# Patient Record
Sex: FEMALE | Race: WHITE | NOT HISPANIC OR LATINO | Employment: OTHER | URBAN - METROPOLITAN AREA
[De-identification: names, ages, dates, MRNs, and addresses within clinical notes are randomized per-mention and may not be internally consistent; named-entity substitution may affect disease eponyms.]

---

## 2022-10-05 ENCOUNTER — HOSPITAL ENCOUNTER (INPATIENT)
Facility: HOSPITAL | Age: 62
LOS: 5 days | Discharge: HOME HEALTH | DRG: 871 | End: 2022-10-10
Attending: EMERGENCY MEDICINE | Admitting: INTERNAL MEDICINE
Payer: COMMERCIAL

## 2022-10-05 DIAGNOSIS — R29.898 MUSCULAR DECONDITIONING: Chronic | ICD-10-CM

## 2022-10-05 DIAGNOSIS — K70.31 ALCOHOLIC CIRRHOSIS OF LIVER WITH ASCITES: Chronic | ICD-10-CM

## 2022-10-05 DIAGNOSIS — M25.562 ACUTE PAIN OF LEFT KNEE: ICD-10-CM

## 2022-10-05 DIAGNOSIS — R53.1 WEAKNESS: ICD-10-CM

## 2022-10-05 DIAGNOSIS — M62.82 NON-TRAUMATIC RHABDOMYOLYSIS: ICD-10-CM

## 2022-10-05 DIAGNOSIS — R29.6 FALLS FREQUENTLY: ICD-10-CM

## 2022-10-05 DIAGNOSIS — F10.10 ETOH ABUSE: Chronic | ICD-10-CM

## 2022-10-05 DIAGNOSIS — I50.9 CHF (CONGESTIVE HEART FAILURE): ICD-10-CM

## 2022-10-05 DIAGNOSIS — L03.116 BILATERAL LOWER LEG CELLULITIS: ICD-10-CM

## 2022-10-05 DIAGNOSIS — L03.115 BILATERAL LOWER LEG CELLULITIS: ICD-10-CM

## 2022-10-05 DIAGNOSIS — K70.30 ALCOHOLIC CIRRHOSIS, UNSPECIFIED WHETHER ASCITES PRESENT: Chronic | ICD-10-CM

## 2022-10-05 DIAGNOSIS — M25.461 PAIN AND SWELLING OF RIGHT KNEE: ICD-10-CM

## 2022-10-05 DIAGNOSIS — R07.9 CHEST PAIN: ICD-10-CM

## 2022-10-05 DIAGNOSIS — Z72.0 TOBACCO ABUSE: Chronic | ICD-10-CM

## 2022-10-05 DIAGNOSIS — M25.561 PAIN AND SWELLING OF RIGHT KNEE: ICD-10-CM

## 2022-10-05 DIAGNOSIS — A41.9 ACUTE ONSET SEPSIS: Primary | ICD-10-CM

## 2022-10-05 LAB
ALBUMIN SERPL-MCNC: 3 GM/DL (ref 3.4–4.8)
ALBUMIN/GLOB SERPL: 0.9 RATIO (ref 1.1–2)
ALP SERPL-CCNC: 75 UNIT/L (ref 40–150)
ALT SERPL-CCNC: 40 UNIT/L (ref 0–55)
AMPHET UR QL SCN: NEGATIVE
APPEARANCE UR: CLEAR
APTT PPP: 29.8 SECONDS (ref 23.2–33.7)
AST SERPL-CCNC: 87 UNIT/L (ref 5–34)
BACTERIA #/AREA URNS AUTO: NORMAL /HPF
BARBITURATE SCN PRESENT UR: NEGATIVE
BASOPHILS # BLD AUTO: 0.05 X10(3)/MCL (ref 0–0.2)
BASOPHILS NFR BLD AUTO: 0.7 %
BENZODIAZ UR QL SCN: NEGATIVE
BILIRUB UR QL STRIP.AUTO: ABNORMAL MG/DL
BILIRUBIN DIRECT+TOT PNL SERPL-MCNC: 2.9 MG/DL
BNP BLD-MCNC: 68.5 PG/ML
BUN SERPL-MCNC: 8.5 MG/DL (ref 9.8–20.1)
CALCIUM SERPL-MCNC: 8.2 MG/DL (ref 8.4–10.2)
CANNABINOIDS UR QL SCN: NEGATIVE
CHLORIDE SERPL-SCNC: 102 MMOL/L (ref 98–107)
CK SERPL-CCNC: 2006 U/L (ref 29–168)
CO2 SERPL-SCNC: 26 MMOL/L (ref 23–31)
COCAINE UR QL SCN: NEGATIVE
COLOR UR AUTO: ABNORMAL
CREAT SERPL-MCNC: 0.51 MG/DL (ref 0.55–1.02)
CRP SERPL-MCNC: 24.2 MG/L
EOSINOPHIL # BLD AUTO: 0.06 X10(3)/MCL (ref 0–0.9)
EOSINOPHIL NFR BLD AUTO: 0.8 %
ERYTHROCYTE [DISTWIDTH] IN BLOOD BY AUTOMATED COUNT: 14.4 % (ref 11.5–17)
ERYTHROCYTE [SEDIMENTATION RATE] IN BLOOD: 29 MM/HR (ref 0–20)
ETHANOL SERPL-MCNC: 32 MG/DL
FENTANYL UR QL SCN: NEGATIVE
GFR SERPLBLD CREATININE-BSD FMLA CKD-EPI: >60 MLS/MIN/1.73/M2
GLOBULIN SER-MCNC: 3.5 GM/DL (ref 2.4–3.5)
GLUCOSE SERPL-MCNC: 135 MG/DL (ref 82–115)
GLUCOSE UR QL STRIP.AUTO: NEGATIVE MG/DL
HCT VFR BLD AUTO: 40.7 % (ref 37–47)
HGB BLD-MCNC: 13.7 GM/DL (ref 12–16)
IMM GRANULOCYTES # BLD AUTO: 0.02 X10(3)/MCL (ref 0–0.04)
IMM GRANULOCYTES NFR BLD AUTO: 0.3 %
INR BLD: 1.32 (ref 0–1.3)
KETONES UR QL STRIP.AUTO: ABNORMAL MG/DL
LACTATE SERPL-SCNC: 1.1 MMOL/L (ref 0.5–2.2)
LACTATE SERPL-SCNC: 2.3 MMOL/L (ref 0.5–2.2)
LEUKOCYTE ESTERASE UR QL STRIP.AUTO: ABNORMAL UNIT/L
LIPASE SERPL-CCNC: 24 U/L
LYMPHOCYTES # BLD AUTO: 0.99 X10(3)/MCL (ref 0.6–4.6)
LYMPHOCYTES NFR BLD AUTO: 13.1 %
MAGNESIUM SERPL-MCNC: 1.5 MG/DL (ref 1.6–2.6)
MCH RBC QN AUTO: 32.5 PG (ref 27–31)
MCHC RBC AUTO-ENTMCNC: 33.7 MG/DL (ref 33–36)
MCV RBC AUTO: 96.4 FL (ref 80–94)
MDMA UR QL SCN: NEGATIVE
MONOCYTES # BLD AUTO: 0.77 X10(3)/MCL (ref 0.1–1.3)
MONOCYTES NFR BLD AUTO: 10.2 %
NEUTROPHILS # BLD AUTO: 5.7 X10(3)/MCL (ref 2.1–9.2)
NEUTROPHILS NFR BLD AUTO: 74.9 %
NITRITE UR QL STRIP.AUTO: POSITIVE
NRBC BLD AUTO-RTO: 0 %
OPIATES UR QL SCN: NEGATIVE
PCP UR QL: NEGATIVE
PH UR STRIP.AUTO: 6.5 [PH]
PH UR: 6.5 [PH] (ref 3–11)
PLATELET # BLD AUTO: 60 X10(3)/MCL (ref 130–400)
PMV BLD AUTO: 11.9 FL (ref 7.4–10.4)
POTASSIUM SERPL-SCNC: 3.6 MMOL/L (ref 3.5–5.1)
PROT SERPL-MCNC: 6.5 GM/DL (ref 5.8–7.6)
PROT UR QL STRIP.AUTO: ABNORMAL MG/DL
PROTHROMBIN TIME: 16.2 SECONDS (ref 12.5–14.5)
RBC # BLD AUTO: 4.22 X10(6)/MCL (ref 4.2–5.4)
RBC #/AREA URNS AUTO: <5 /HPF
RBC UR QL AUTO: NEGATIVE UNIT/L
SODIUM SERPL-SCNC: 140 MMOL/L (ref 136–145)
SP GR UR STRIP.AUTO: 1.02 (ref 1–1.03)
SPECIFIC GRAVITY, URINE AUTO (.000) (OHS): 1.02 (ref 1–1.03)
SQUAMOUS #/AREA URNS AUTO: <5 /HPF
TROPONIN I SERPL-MCNC: 0.02 NG/ML (ref 0–0.04)
UROBILINOGEN UR STRIP-ACNC: 4 MG/DL
WBC # SPEC AUTO: 7.6 X10(3)/MCL (ref 4.5–11.5)
WBC #/AREA URNS AUTO: <5 /HPF

## 2022-10-05 PROCEDURE — 86140 C-REACTIVE PROTEIN: CPT | Performed by: EMERGENCY MEDICINE

## 2022-10-05 PROCEDURE — 63600175 PHARM REV CODE 636 W HCPCS: Performed by: EMERGENCY MEDICINE

## 2022-10-05 PROCEDURE — 85730 THROMBOPLASTIN TIME PARTIAL: CPT | Performed by: EMERGENCY MEDICINE

## 2022-10-05 PROCEDURE — 36415 COLL VENOUS BLD VENIPUNCTURE: CPT | Performed by: EMERGENCY MEDICINE

## 2022-10-05 PROCEDURE — 80307 DRUG TEST PRSMV CHEM ANLYZR: CPT | Performed by: EMERGENCY MEDICINE

## 2022-10-05 PROCEDURE — 80074 ACUTE HEPATITIS PANEL: CPT | Performed by: NURSE PRACTITIONER

## 2022-10-05 PROCEDURE — 21400001 HC TELEMETRY ROOM

## 2022-10-05 PROCEDURE — 81001 URINALYSIS AUTO W/SCOPE: CPT | Performed by: EMERGENCY MEDICINE

## 2022-10-05 PROCEDURE — 85651 RBC SED RATE NONAUTOMATED: CPT | Performed by: EMERGENCY MEDICINE

## 2022-10-05 PROCEDURE — 80053 COMPREHEN METABOLIC PANEL: CPT | Performed by: EMERGENCY MEDICINE

## 2022-10-05 PROCEDURE — 85610 PROTHROMBIN TIME: CPT | Performed by: EMERGENCY MEDICINE

## 2022-10-05 PROCEDURE — 93005 ELECTROCARDIOGRAM TRACING: CPT

## 2022-10-05 PROCEDURE — 87522 HEPATITIS C REVRS TRNSCRPJ: CPT | Performed by: NURSE PRACTITIONER

## 2022-10-05 PROCEDURE — 99291 CRITICAL CARE FIRST HOUR: CPT | Mod: 25

## 2022-10-05 PROCEDURE — 83690 ASSAY OF LIPASE: CPT | Performed by: EMERGENCY MEDICINE

## 2022-10-05 PROCEDURE — 93010 ELECTROCARDIOGRAM REPORT: CPT | Mod: ,,, | Performed by: INTERNAL MEDICINE

## 2022-10-05 PROCEDURE — 83605 ASSAY OF LACTIC ACID: CPT | Performed by: EMERGENCY MEDICINE

## 2022-10-05 PROCEDURE — 82550 ASSAY OF CK (CPK): CPT | Performed by: EMERGENCY MEDICINE

## 2022-10-05 PROCEDURE — 85025 COMPLETE CBC W/AUTO DIFF WBC: CPT | Performed by: EMERGENCY MEDICINE

## 2022-10-05 PROCEDURE — 11000001 HC ACUTE MED/SURG PRIVATE ROOM

## 2022-10-05 PROCEDURE — 93010 EKG 12-LEAD: ICD-10-PCS | Mod: ,,, | Performed by: INTERNAL MEDICINE

## 2022-10-05 PROCEDURE — 96374 THER/PROPH/DIAG INJ IV PUSH: CPT

## 2022-10-05 PROCEDURE — 87040 BLOOD CULTURE FOR BACTERIA: CPT | Performed by: EMERGENCY MEDICINE

## 2022-10-05 PROCEDURE — 96375 TX/PRO/DX INJ NEW DRUG ADDON: CPT

## 2022-10-05 PROCEDURE — 82077 ASSAY SPEC XCP UR&BREATH IA: CPT | Performed by: EMERGENCY MEDICINE

## 2022-10-05 PROCEDURE — 83735 ASSAY OF MAGNESIUM: CPT | Performed by: EMERGENCY MEDICINE

## 2022-10-05 PROCEDURE — 25000003 PHARM REV CODE 250: Performed by: EMERGENCY MEDICINE

## 2022-10-05 PROCEDURE — 83880 ASSAY OF NATRIURETIC PEPTIDE: CPT | Performed by: EMERGENCY MEDICINE

## 2022-10-05 PROCEDURE — 84484 ASSAY OF TROPONIN QUANT: CPT | Performed by: EMERGENCY MEDICINE

## 2022-10-05 RX ORDER — FOLIC ACID 1 MG/1
1 TABLET ORAL DAILY
Status: DISCONTINUED | OUTPATIENT
Start: 2022-10-06 | End: 2022-10-10 | Stop reason: HOSPADM

## 2022-10-05 RX ORDER — PROCHLORPERAZINE EDISYLATE 5 MG/ML
5 INJECTION INTRAMUSCULAR; INTRAVENOUS EVERY 6 HOURS PRN
Status: DISCONTINUED | OUTPATIENT
Start: 2022-10-05 | End: 2022-10-10 | Stop reason: HOSPADM

## 2022-10-05 RX ORDER — CHLORDIAZEPOXIDE HYDROCHLORIDE 25 MG/1
25 CAPSULE, GELATIN COATED ORAL 4 TIMES DAILY PRN
Status: DISCONTINUED | OUTPATIENT
Start: 2022-10-06 | End: 2022-10-10 | Stop reason: HOSPADM

## 2022-10-05 RX ORDER — VANCOMYCIN HCL IN 5 % DEXTROSE 1G/250ML
2000 PLASTIC BAG, INJECTION (ML) INTRAVENOUS
Status: COMPLETED | OUTPATIENT
Start: 2022-10-05 | End: 2022-10-05

## 2022-10-05 RX ORDER — ONDANSETRON 2 MG/ML
4 INJECTION INTRAMUSCULAR; INTRAVENOUS ONCE
Status: COMPLETED | OUTPATIENT
Start: 2022-10-05 | End: 2022-10-05

## 2022-10-05 RX ORDER — SODIUM CHLORIDE 9 MG/ML
INJECTION, SOLUTION INTRAVENOUS CONTINUOUS
Status: DISCONTINUED | OUTPATIENT
Start: 2022-10-06 | End: 2022-10-05

## 2022-10-05 RX ORDER — POLYETHYLENE GLYCOL 3350 17 G/17G
17 POWDER, FOR SOLUTION ORAL 2 TIMES DAILY PRN
Status: DISCONTINUED | OUTPATIENT
Start: 2022-10-05 | End: 2022-10-10 | Stop reason: HOSPADM

## 2022-10-05 RX ORDER — MAGNESIUM SULFATE HEPTAHYDRATE 40 MG/ML
2 INJECTION, SOLUTION INTRAVENOUS
Status: COMPLETED | OUTPATIENT
Start: 2022-10-05 | End: 2022-10-05

## 2022-10-05 RX ORDER — VANCOMYCIN HCL IN 5 % DEXTROSE 1G/250ML
1000 PLASTIC BAG, INJECTION (ML) INTRAVENOUS
Status: DISCONTINUED | OUTPATIENT
Start: 2022-10-05 | End: 2022-10-05

## 2022-10-05 RX ORDER — MAG HYDROX/ALUMINUM HYD/SIMETH 200-200-20
30 SUSPENSION, ORAL (FINAL DOSE FORM) ORAL EVERY 4 HOURS PRN
Status: DISCONTINUED | OUTPATIENT
Start: 2022-10-05 | End: 2022-10-10 | Stop reason: HOSPADM

## 2022-10-05 RX ORDER — FUROSEMIDE 10 MG/ML
40 INJECTION INTRAMUSCULAR; INTRAVENOUS ONCE
Status: COMPLETED | OUTPATIENT
Start: 2022-10-06 | End: 2022-10-06

## 2022-10-05 RX ORDER — ONDANSETRON 2 MG/ML
4 INJECTION INTRAMUSCULAR; INTRAVENOUS EVERY 4 HOURS PRN
Status: DISCONTINUED | OUTPATIENT
Start: 2022-10-05 | End: 2022-10-10 | Stop reason: HOSPADM

## 2022-10-05 RX ORDER — ACETAMINOPHEN 325 MG/1
650 TABLET ORAL EVERY 4 HOURS PRN
Status: DISCONTINUED | OUTPATIENT
Start: 2022-10-05 | End: 2022-10-10 | Stop reason: HOSPADM

## 2022-10-05 RX ORDER — BISACODYL 10 MG
10 SUPPOSITORY, RECTAL RECTAL DAILY PRN
Status: DISCONTINUED | OUTPATIENT
Start: 2022-10-05 | End: 2022-10-10 | Stop reason: HOSPADM

## 2022-10-05 RX ORDER — AMOXICILLIN 250 MG
1 CAPSULE ORAL 2 TIMES DAILY PRN
Status: DISCONTINUED | OUTPATIENT
Start: 2022-10-05 | End: 2022-10-10 | Stop reason: HOSPADM

## 2022-10-05 RX ORDER — VANCOMYCIN HCL IN 5 % DEXTROSE 1G/250ML
2000 PLASTIC BAG, INJECTION (ML) INTRAVENOUS
Status: DISCONTINUED | OUTPATIENT
Start: 2022-10-05 | End: 2022-10-05

## 2022-10-05 RX ORDER — HYDROCODONE BITARTRATE AND ACETAMINOPHEN 10; 325 MG/1; MG/1
1 TABLET ORAL
Status: COMPLETED | OUTPATIENT
Start: 2022-10-05 | End: 2022-10-05

## 2022-10-05 RX ORDER — SODIUM CHLORIDE 0.9 % (FLUSH) 0.9 %
10 SYRINGE (ML) INJECTION
Status: DISCONTINUED | OUTPATIENT
Start: 2022-10-05 | End: 2022-10-10 | Stop reason: HOSPADM

## 2022-10-05 RX ADMIN — ONDANSETRON 4 MG: 2 INJECTION INTRAMUSCULAR; INTRAVENOUS at 09:10

## 2022-10-05 RX ADMIN — PIPERACILLIN AND TAZOBACTAM 4.5 G: 4; .5 INJECTION, POWDER, LYOPHILIZED, FOR SOLUTION INTRAVENOUS; PARENTERAL at 04:10

## 2022-10-05 RX ADMIN — MAGNESIUM SULFATE HEPTAHYDRATE 2 G: 40 INJECTION, SOLUTION INTRAVENOUS at 07:10

## 2022-10-05 RX ADMIN — SODIUM CHLORIDE, POTASSIUM CHLORIDE, SODIUM LACTATE AND CALCIUM CHLORIDE 1000 ML: 600; 310; 30; 20 INJECTION, SOLUTION INTRAVENOUS at 09:10

## 2022-10-05 RX ADMIN — SODIUM CHLORIDE 500 ML: 9 INJECTION, SOLUTION INTRAVENOUS at 04:10

## 2022-10-05 RX ADMIN — VANCOMYCIN HYDROCHLORIDE 2000 MG: 1 INJECTION, POWDER, LYOPHILIZED, FOR SOLUTION INTRAVENOUS at 07:10

## 2022-10-05 RX ADMIN — HYDROCODONE BITARTRATE AND ACETAMINOPHEN 1 TABLET: 10; 325 TABLET ORAL at 07:10

## 2022-10-05 NOTE — ED PROVIDER NOTES
Encounter Date: 10/5/2022    SCRIBE #1 NOTE: I, Karla Shoaib, am scribing for, and in the presence of,  Eunice Morse MD. I have scribed the following portions of the note - Other sections scribed: HPI, ROS, PE.     History     Chief Complaint   Patient presents with    Fall     Trip/fall today. Abrasions to bilateral knees & elbows. States she did hit her head, no head trauma, -LOC, -BT. Denies LOC. EMS was called to pts house yesterday for a fall and was on the floor 2 days, refused ambulance transfer at that time. Fall again today at approx 0400.      61 year old female with a hx of Hepatitis C presents to the ED via EMS for multiple falling over the past few days; pt fell again today at approximately 0400. Per EMS, pt has not received medical care for the past 8 years and her abdomen has been distended for 4 years. EMS states the pt fell a couple of days ago and has been laying in her urine and feces. She reports abrasions to bilateral knees, and edema to bilateral LE. Pt reports she slipped on her wet floor and was stuck on the ground until she was able to get her neighbor's attention around 1500. Notes she fell a couple of days ago and was on her knees for awhile due to not being able to stand back up. Pt c/o of bilateral knee pain, frontal headaches, weakness, abdominal pain, and shortness of breath. She denies vomiting and diarrhea. Pt states she is a heavy smoker. EMS reports pt was at 93%, after 2L increased to 96 with CBG of 169.     The history is provided by the patient and the EMS personnel. No  was used.   Fall  Illness onset: 0400. The fall occurred while walking (slipped on wet floor). The pain is present in the head (bilateral knees). Associated symptoms include abdominal pain, bowel incontinence and headaches. Pertinent negatives include no fever, no numbness, no nausea, no vomiting and no hematuria. The symptoms are aggravated by activity and standing. She has tried nothing  for the symptoms. The treatment provided no relief.   Review of patient's allergies indicates:  No Known Allergies  Past Medical History:   Diagnosis Date    Unspecified viral hepatitis C without hepatic coma      History reviewed. No pertinent surgical history.  History reviewed. No pertinent family history.     Review of Systems   Constitutional:  Negative for chills, diaphoresis and fever.   HENT:  Negative for congestion and sore throat.    Eyes:  Negative for visual disturbance.   Respiratory:  Negative for cough and shortness of breath.    Cardiovascular:  Negative for chest pain and palpitations.   Gastrointestinal:  Positive for abdominal distention, abdominal pain and bowel incontinence. Negative for diarrhea, nausea and vomiting.   Genitourinary:  Negative for dysuria and hematuria.        Urinary and bowel incontinent    Musculoskeletal:         Bilateral knee pain   Skin:  Negative for rash.   Neurological:  Positive for weakness and headaches. Negative for syncope and numbness.   All other systems reviewed and are negative.        Physical Exam     Initial Vitals [10/05/22 1535]   BP Pulse Resp Temp SpO2   (!) 146/80 108 (!) 24 97.9 °F (36.6 °C) 96 %      MAP       --         Physical Exam    Nursing note and vitals reviewed.  Constitutional: She appears well-developed. She is not diaphoretic.   Disheveled    HENT:   Head: Normocephalic and atraumatic.   Right Ear: External ear normal.   Left Ear: External ear normal.   Mouth/Throat: Oropharynx is clear and moist.   Small hematoma on R frontal area. Dry mucous membranes .    Eyes: Conjunctivae and EOM are normal. Pupils are equal, round, and reactive to light.   Neck: Neck supple. No tracheal deviation present.   Cardiovascular:  Regular rhythm, normal heart sounds and intact distal pulses.   Tachycardia present.         No murmur heard.  Pulmonary/Chest: No respiratory distress. She has wheezes (diffuse). She has no rhonchi. She has no rales.    Abdominal: Abdomen is soft. Bowel sounds are normal. There is no abdominal tenderness.   Ascites    No right CVA tenderness.  No left CVA tenderness.   Musculoskeletal:         General: Edema present. Normal range of motion.      Cervical back: Neck supple.      Comments: 1+ pedal edema bilaterally.      Neurological: She is alert and oriented to person, place, and time. She has normal strength. No cranial nerve deficit or sensory deficit. GCS score is 15. GCS eye subscore is 4. GCS verbal subscore is 5. GCS motor subscore is 6.   Skin: Skin is warm and dry. Capillary refill takes less than 2 seconds. No rash noted. No pallor.   Contusion to R upper arm. Bruising to L thigh. Large abrasions to inferior portion of bilateral knees with cellulitis from superior knee all the way down to distal toes. L knee abrasion has fluid filled blisters surrounding the knee.   Psychiatric: She has a normal mood and affect. Her behavior is normal.         ED Course   Critical Care    Date/Time: 10/5/2022 3:49 PM  Performed by: Eunice Morse MD  Authorized by: Eunice Morse MD   Direct patient critical care time: 20 minutes  Ordering / reviewing critical care time: 5 minutes  Documentation critical care time: 5 minutes  Consulting other physicians critical care time: 3 minutes  Total critical care time (exclusive of procedural time) : 33 minutes  Critical care was necessary to treat or prevent imminent or life-threatening deterioration of the following conditions: cardiac failure, dehydration, metabolic crisis, sepsis, circulatory failure, CNS failure or compromise, hepatic failure, renal failure and shock.  Critical care was time spent personally by me on the following activities: development of treatment plan with patient or surrogate, discussions with primary provider, evaluation of patient's response to treatment, examination of patient, obtaining history from patient or surrogate, ordering and performing treatments and  interventions, ordering and review of laboratory studies, ordering and review of radiographic studies, pulse oximetry and re-evaluation of patient's condition.      Labs Reviewed   COMPREHENSIVE METABOLIC PANEL - Abnormal; Notable for the following components:       Result Value    Glucose Level 135 (*)     Blood Urea Nitrogen 8.5 (*)     Creatinine 0.51 (*)     Calcium Level Total 8.2 (*)     Albumin Level 3.0 (*)     Albumin/Globulin Ratio 0.9 (*)     Bilirubin Total 2.9 (*)     Aspartate Aminotransferase 87 (*)     All other components within normal limits   ALCOHOL,MEDICAL (ETHANOL) - Abnormal; Notable for the following components:    Ethanol Level 32.0 (*)     All other components within normal limits   LACTIC ACID, PLASMA - Abnormal; Notable for the following components:    Lactic Acid Level 2.3 (*)     All other components within normal limits   MAGNESIUM - Abnormal; Notable for the following components:    Magnesium Level 1.50 (*)     All other components within normal limits   PROTIME-INR - Abnormal; Notable for the following components:    PT 16.2 (*)     INR 1.32 (*)     All other components within normal limits   URINALYSIS, REFLEX TO URINE CULTURE - Abnormal; Notable for the following components:    Color, UA Orange (*)     Protein, UA 1+ (*)     Ketones, UA Trace (*)     Bilirubin, UA 1+ (*)     Urobilinogen, UA 4.0 (*)     Nitrites, UA Positive (*)     Leukocyte Esterase, UA Trace (*)     All other components within normal limits   C-REACTIVE PROTEIN - Abnormal; Notable for the following components:    C-Reactive Protein 24.20 (*)     All other components within normal limits   SEDIMENTATION RATE, AUTOMATED - Abnormal; Notable for the following components:    Sed Rate 29 (*)     All other components within normal limits   CBC WITH DIFFERENTIAL - Abnormal; Notable for the following components:    MCV 96.4 (*)     MCH 32.5 (*)     Platelet 60 (*)     MPV 11.9 (*)     All other components within normal  limits   CK - Abnormal; Notable for the following components:    Creatine Kinase 2,006 (*)     All other components within normal limits   B-TYPE NATRIURETIC PEPTIDE - Normal   DRUG SCREEN, URINE (BEAKER) - Normal    Narrative:     Cut off concentrations:    Amphetamines - 1000 ng/ml  Barbiturates - 200 ng/ml  Benzodiazepine - 200 ng/ml  Cannabinoids (THC) - 50 ng/ml  Cocaine - 300 ng/ml  Fentanyl - 1.0 ng/ml  MDMA - 500 ng/ml  Opiates - 300 ng/ml   Phencyclidine (PCP) - 25 ng/ml    Specimen submitted for drug analysis and tested for pH and specific gravity in order to evaluate sample integrity. Suspect tampering if specific gravity is <1.003 and/or pH is not within the range of 4.5 - 8.0  False negatives may result form substances such as bleach added to urine.  False positives may result for the presence of a substance with similar chemical structure to the drug or its metabolite.    This test provides only a PRELIMINARY analytical test result. A more specific alternate chemical method must be used in order to obtain a confirmed analytical result. Gas chromatography/mass spectrometry (GC/MS) is the preferred confirmatory method. Other chemical confirmation methods are available. Clinical consideration and professional judgement should be applied to any drug of abuse test result, particularly when preliminary positive results are used.    Positive results will be confirmed only at the physicians request. Unconfirmed screening results are to be used only for medical purposes (treatment).        LIPASE - Normal   TROPONIN I - Normal   APTT - Normal   URINALYSIS, MICROSCOPIC - Normal   BLOOD CULTURE [facility]   BLOOD CULTURE [facility]   CBC W/ AUTO DIFFERENTIAL    Narrative:     The following orders were created for panel order CBC auto differential.  Procedure                               Abnormality         Status                     ---------                               -----------         ------                     CBC  with Differential[[id number]]        Abnormal            Final result                 Please view results for these tests on the individual orders.     EKG Readings: (Independently Interpreted)   Initial Reading: No STEMI. Rhythm: Sinus Tachycardia. Heart Rate: 106. Ectopy: No Ectopy. T Waves: Normal. Axis: Normal. Clinical Impression: Left Ventricular Hypertrophy (LDH)     Imaging Results              CT Head Without Contrast (Final result)  Result time 10/05/22 17:52:09      Final result by Joshua Dupree MD (10/05/22 17:52:09)                   Impression:      No acute abnormality seen      Electronically signed by: Joshua Dupree  Date:    10/05/2022  Time:    17:52               Narrative:    EXAMINATION:  CT HEAD WITHOUT CONTRAST    CLINICAL HISTORY:  Polytrauma, blunt;    TECHNIQUE:  Multiple axial images were obtained from the base of the brain to the vertex without contrast administration.  Sagittal and coronal reconstructions were performed. .Automatic exposure control  (AEC) is utilized to reduce patient radiation exposure.    COMPARISON:  None    FINDINGS:  There is no intracranial mass or lesion seen.  No hemorrhage is seen.  No infarct is seen.  The ventricles and basilar cisterns appear normal.  Brain parenchyma appears grossly unremarkable.    Posterior fossa appears normal.  The calvarium is intact.  The paranasal sinuses appear grossly unremarkable.                                       CT Cervical Spine Without Contrast (Final result)  Result time 10/05/22 17:49:17      Final result by Joshua Dupree MD (10/05/22 17:49:17)                   Impression:      Loss of the normal lordotic curve of the cervical spine most likely related to spasm but otherwise unremarkable with no evidence of acute fracture or dislocation seen      Electronically signed by: Joshua Dupree  Date:    10/05/2022  Time:    17:49               Narrative:    EXAMINATION:  CT CERVICAL SPINE WITHOUT  CONTRAST    CLINICAL HISTORY:  Polytrauma, blunt;    TECHNIQUE:  Low dose axial images, sagittal and coronal reformations were performed though the cervical spine.  Contrast was not administered. Automatic exposure control is utilized to reduce patient radiation exposure.    COMPARISON:  None    FINDINGS:  The vertebral body heights are well maintained. There is some loss of the normal lordotic curve cervical spine most likely related to spasm.  There are degenerative changes seen in the mid cervical spine mainly at C5 and C6.  No fracture is seen. No dislocation is seen. The odontoid and lateral masses appear grossly unremarkable.                                       X-Ray Knee Complete 4 or More Views Left (Final result)  Result time 10/05/22 16:57:58      Final result by Danyell Mckinney MD (10/05/22 16:57:58)                   Impression:      Degenerative changes with small joint effusion.      Electronically signed by: Danyell Mckinney  Date:    10/05/2022  Time:    16:57               Narrative:    EXAMINATION:  XR KNEE COMP 4 OR MORE VIEWS LEFT    CLINICAL HISTORY:  Pain in left knee    COMPARISON:  None.    FINDINGS:  There is no acute fracture or malalignment identified.  There are degenerative changes with small tricompartmental osteophytes and joint space narrowing in the medial compartment.  There is a small knee joint effusion.  The soft tissues are unremarkable.                                       X-Ray Knee Complete 4 Or More Views Right (Final result)  Result time 10/05/22 16:57:14      Final result by Danyell Mckinney MD (10/05/22 16:57:14)                   Impression:      No acute abnormality identified.      Electronically signed by: Danyell Mckinney  Date:    10/05/2022  Time:    16:57               Narrative:    EXAMINATION:  XR KNEE COMP 4 OR MORE VIEWS RIGHT    CLINICAL HISTORY:  Pain in right knee    COMPARISON:  None.    FINDINGS:  There is satisfactory alignment of prior right  total knee arthroplasty.  There is no acute fracture identified.  There is no knee joint effusion.  The soft tissues are unremarkable.                                       X-Ray Chest AP Portable (Final result)  Result time 10/05/22 16:56:40      Final result by Danyell cMkinney MD (10/05/22 16:56:40)                   Impression:      Prominent interstitial markings may represent pulmonary venous congestion.      Electronically signed by: Danyell Mckinney  Date:    10/05/2022  Time:    16:56               Narrative:    EXAMINATION:  XR CHEST AP PORTABLE    CLINICAL HISTORY:  Shortness of breath    COMPARISON:  None    FINDINGS:  The heart is within normal limits for technique.  There are prominent interstitial markings without focal consolidation.  There is no pleural effusion or visible pneumothorax.                                       Medications   sodium chloride 0.9% bolus 500 mL (500 mLs Intravenous New Bag 10/5/22 1615)   piperacillin-tazobactam (ZOSYN) 4.5 g in dextrose 5 % in water (D5W) 5 % 100 mL IVPB (MB+) (4.5 g Intravenous New Bag 10/5/22 1615)   vancomycin in dextrose 5 % 1 gram/250 mL IVPB 2,000 mg (2,000 mg Intravenous New Bag 10/5/22 1919)   magnesium sulfate 2g in water 50mL IVPB (premix) (0 g Intravenous Stopped 10/5/22 2139)   lactated ringers bolus 1,000 mL (1,000 mLs Intravenous New Bag 10/5/22 2140)   HYDROcodone-acetaminophen  mg per tablet 1 tablet (1 tablet Oral Given 10/5/22 1938)   ondansetron injection 4 mg (4 mg Intravenous Given 10/5/22 2143)        Medical Decision Making:   History:   I obtained history from: EMS provider.       <> Summary of History: Patient with no medical care for 8 years, multiple falls  Initial Assessment:   Disheveled, abrasions to both knees with cellulitis, ascites   Independently Interpreted Test(s):   I have ordered and independently interpreted X-rays - see prior notes.  Clinical Tests:   Lab Tests: Ordered and Reviewed       <> Summary of  Lab: Hypoalbuminemia, elevated bili, elevated lactic, UTI, elevated CRP/ESR, elevated CK   Radiological Study: Ordered and Reviewed  Medical Tests: Ordered and Reviewed  ED Management:  Given IVF and zofran  Cultures obtained and Vanc + Zosyn   Replaced Magnesium   IVF for rhabdo   Admitted to hospitalist   Other:   I have discussed this case with another health care provider.        Scribe Attestation:   Scribe #1: I performed the above scribed service and the documentation accurately describes the services I performed. I attest to the accuracy of the note.    Attending Attestation:           Physician Attestation for Scribe:  Physician Attestation Statement for Scribe #1: I, Eunice Morse MD, reviewed documentation, as scribed by Karla Cramer in my presence, and it is both accurate and complete.           ED Course as of 10/06/22 0042   Wed Oct 05, 2022   1915 Magnesium(!): 1.50 [KM]   1915 CRP(!): 24.20 [KM]   1915 Lactate, Ken(!): 2.3 [KM]   1915 Sed Rate(!): 29 [KM]   1915 CPK(!): 2,006 [KM]   1915 NITRITE UA(!): Positive [KM]   1934 Call and Consult Hospitalist [DP]      ED Course User Index  [DP] Ngoc Mcdermott  [KM] Eunice Morse MD                 Clinical Impression:   Final diagnoses:  [R53.1] Weakness  [M25.561, M25.461] Pain and swelling of right knee  [M25.562] Acute pain of left knee  [L03.116, L03.115] Bilateral lower leg cellulitis  [M62.82] Non-traumatic rhabdomyolysis  [A41.9] Acute onset sepsis (Primary)        ED Disposition Condition    Admit Stable                Eunice Morse MD  10/06/22 0050

## 2022-10-06 LAB
ALBUMIN SERPL-MCNC: 3 GM/DL (ref 3.4–4.8)
ALBUMIN/GLOB SERPL: 0.8 RATIO (ref 1.1–2)
ALP SERPL-CCNC: 76 UNIT/L (ref 40–150)
ALT SERPL-CCNC: 37 UNIT/L (ref 0–55)
AMPHET UR QL SCN: NEGATIVE
AST SERPL-CCNC: 70 UNIT/L (ref 5–34)
AV INDEX (PROSTH): 0.56
AV MEAN GRADIENT: 23 MMHG
AV PEAK GRADIENT: 37 MMHG
AV VALVE AREA: 1.58 CM2
AV VELOCITY RATIO: 0.55
BARBITURATE SCN PRESENT UR: NEGATIVE
BASOPHILS # BLD AUTO: 0.06 X10(3)/MCL (ref 0–0.2)
BASOPHILS NFR BLD AUTO: 0.9 %
BENZODIAZ UR QL SCN: NEGATIVE
BILIRUBIN DIRECT+TOT PNL SERPL-MCNC: 3.3 MG/DL
BSA FOR ECHO PROCEDURE: 2.32 M2
BUN SERPL-MCNC: 7.7 MG/DL (ref 9.8–20.1)
CALCIUM SERPL-MCNC: 8 MG/DL (ref 8.4–10.2)
CANNABINOIDS UR QL SCN: NEGATIVE
CHLORIDE SERPL-SCNC: 100 MMOL/L (ref 98–107)
CK SERPL-CCNC: 1115 U/L (ref 29–168)
CO2 SERPL-SCNC: 30 MMOL/L (ref 23–31)
COCAINE UR QL SCN: NEGATIVE
CREAT SERPL-MCNC: 0.56 MG/DL (ref 0.55–1.02)
CV ECHO LV RWT: 0.44 CM
DOP CALC AO PEAK VEL: 3.05 M/S
DOP CALC AO VTI: 53.7 CM
DOP CALC LVOT AREA: 2.8 CM2
DOP CALC LVOT DIAMETER: 1.9 CM
DOP CALC LVOT PEAK VEL: 1.69 M/S
DOP CALC LVOT STROKE VOLUME: 85.02 CM3
DOP CALC MV VTI: 29.9 CM
DOP CALCLVOT PEAK VEL VTI: 30 CM
E WAVE DECELERATION TIME: 174 MSEC
E/A RATIO: 1.04
E/E' RATIO: 9.62 M/S
ECHO LV POSTERIOR WALL: 1.06 CM (ref 0.6–1.1)
EJECTION FRACTION: 65 %
EOSINOPHIL # BLD AUTO: 0.16 X10(3)/MCL (ref 0–0.9)
EOSINOPHIL NFR BLD AUTO: 2.4 %
ERYTHROCYTE [DISTWIDTH] IN BLOOD BY AUTOMATED COUNT: 14.4 % (ref 11.5–17)
FENTANYL UR QL SCN: NEGATIVE
FRACTIONAL SHORTENING: 37 % (ref 28–44)
GFR SERPLBLD CREATININE-BSD FMLA CKD-EPI: >60 MLS/MIN/1.73/M2
GLOBULIN SER-MCNC: 3.6 GM/DL (ref 2.4–3.5)
GLUCOSE SERPL-MCNC: 137 MG/DL (ref 82–115)
HAV IGM SERPL QL IA: NONREACTIVE
HBV CORE IGM SERPL QL IA: NONREACTIVE
HBV SURFACE AG SERPL QL IA: NONREACTIVE
HCT VFR BLD AUTO: 41.4 % (ref 37–47)
HCV AB SERPL QL IA: REACTIVE
HGB BLD-MCNC: 13.5 GM/DL (ref 12–16)
IMM GRANULOCYTES # BLD AUTO: 0.02 X10(3)/MCL (ref 0–0.04)
IMM GRANULOCYTES NFR BLD AUTO: 0.3 %
INTERVENTRICULAR SEPTUM: 1.05 CM (ref 0.6–1.1)
LEFT ATRIUM SIZE: 4.4 CM
LEFT ATRIUM VOLUME INDEX MOD: 41.1 ML/M2
LEFT ATRIUM VOLUME MOD: 91.3 CM3
LEFT INTERNAL DIMENSION IN SYSTOLE: 3.07 CM (ref 2.1–4)
LEFT VENTRICLE DIASTOLIC VOLUME INDEX: 50 ML/M2
LEFT VENTRICLE DIASTOLIC VOLUME: 111 ML
LEFT VENTRICLE MASS INDEX: 84 G/M2
LEFT VENTRICLE SYSTOLIC VOLUME INDEX: 16.7 ML/M2
LEFT VENTRICLE SYSTOLIC VOLUME: 37 ML
LEFT VENTRICULAR INTERNAL DIMENSION IN DIASTOLE: 4.86 CM (ref 3.5–6)
LEFT VENTRICULAR MASS: 186.82 G
LV LATERAL E/E' RATIO: 8.42 M/S
LV SEPTAL E/E' RATIO: 11.22 M/S
LVOT MG: 7 MMHG
LVOT MV: 1.2 CM/S
LYMPHOCYTES # BLD AUTO: 0.74 X10(3)/MCL (ref 0.6–4.6)
LYMPHOCYTES NFR BLD AUTO: 11.1 %
MAGNESIUM SERPL-MCNC: 1.7 MG/DL (ref 1.6–2.6)
MCH RBC QN AUTO: 32.1 PG (ref 27–31)
MCHC RBC AUTO-ENTMCNC: 32.6 MG/DL (ref 33–36)
MCV RBC AUTO: 98.3 FL (ref 80–94)
MDMA UR QL SCN: NEGATIVE
MONOCYTES # BLD AUTO: 0.69 X10(3)/MCL (ref 0.1–1.3)
MONOCYTES NFR BLD AUTO: 10.4 %
MV MEAN GRADIENT: 4 MMHG
MV PEAK A VEL: 0.97 M/S
MV PEAK E VEL: 1.01 M/S
MV PEAK GRADIENT: 8 MMHG
MV STENOSIS PRESSURE HALF TIME: 50 MS
MV VALVE AREA BY CONTINUITY EQUATION: 2.84 CM2
MV VALVE AREA P 1/2 METHOD: 4.4 CM2
NEUTROPHILS # BLD AUTO: 5 X10(3)/MCL (ref 2.1–9.2)
NEUTROPHILS NFR BLD AUTO: 74.9 %
NRBC BLD AUTO-RTO: 0 %
OPIATES UR QL SCN: POSITIVE
PCP UR QL: NEGATIVE
PH UR: 6 [PH] (ref 3–11)
PHOSPHATE SERPL-MCNC: 3.4 MG/DL (ref 2.3–4.7)
PISA TR MAX VEL: 2.25 M/S
PLATELET # BLD AUTO: 50 X10(3)/MCL (ref 130–400)
PMV BLD AUTO: 12.8 FL (ref 7.4–10.4)
POTASSIUM SERPL-SCNC: 3.4 MMOL/L (ref 3.5–5.1)
PROT SERPL-MCNC: 6.6 GM/DL (ref 5.8–7.6)
RA PRESSURE: 3 MMHG
RBC # BLD AUTO: 4.21 X10(6)/MCL (ref 4.2–5.4)
SINUS: 2.8 CM
SODIUM SERPL-SCNC: 138 MMOL/L (ref 136–145)
SPECIFIC GRAVITY, URINE AUTO (.000) (OHS): 1.02 (ref 1–1.03)
TDI LATERAL: 0.12 M/S
TDI SEPTAL: 0.09 M/S
TDI: 0.11 M/S
TR MAX PG: 20 MMHG
TRICUSPID ANNULAR PLANE SYSTOLIC EXCURSION: 2.98 CM
TV REST PULMONARY ARTERY PRESSURE: 23 MMHG
WBC # SPEC AUTO: 6.6 X10(3)/MCL (ref 4.5–11.5)

## 2022-10-06 PROCEDURE — 84100 ASSAY OF PHOSPHORUS: CPT | Performed by: NURSE PRACTITIONER

## 2022-10-06 PROCEDURE — 80307 DRUG TEST PRSMV CHEM ANLYZR: CPT | Performed by: NURSE PRACTITIONER

## 2022-10-06 PROCEDURE — 21400001 HC TELEMETRY ROOM

## 2022-10-06 PROCEDURE — 80053 COMPREHEN METABOLIC PANEL: CPT | Performed by: NURSE PRACTITIONER

## 2022-10-06 PROCEDURE — 25000003 PHARM REV CODE 250: Performed by: NURSE PRACTITIONER

## 2022-10-06 PROCEDURE — 85025 COMPLETE CBC W/AUTO DIFF WBC: CPT | Performed by: NURSE PRACTITIONER

## 2022-10-06 PROCEDURE — 63600175 PHARM REV CODE 636 W HCPCS: Performed by: NURSE PRACTITIONER

## 2022-10-06 PROCEDURE — 82550 ASSAY OF CK (CPK): CPT | Performed by: INTERNAL MEDICINE

## 2022-10-06 PROCEDURE — 83735 ASSAY OF MAGNESIUM: CPT | Performed by: NURSE PRACTITIONER

## 2022-10-06 PROCEDURE — 36415 COLL VENOUS BLD VENIPUNCTURE: CPT | Performed by: NURSE PRACTITIONER

## 2022-10-06 RX ORDER — OXYCODONE AND ACETAMINOPHEN 5; 325 MG/1; MG/1
1 TABLET ORAL EVERY 6 HOURS PRN
Status: DISCONTINUED | OUTPATIENT
Start: 2022-10-06 | End: 2022-10-10 | Stop reason: HOSPADM

## 2022-10-06 RX ORDER — LABETALOL HYDROCHLORIDE 5 MG/ML
10 INJECTION, SOLUTION INTRAVENOUS EVERY 4 HOURS PRN
Status: DISCONTINUED | OUTPATIENT
Start: 2022-10-06 | End: 2022-10-10 | Stop reason: HOSPADM

## 2022-10-06 RX ORDER — AMLODIPINE BESYLATE 5 MG/1
5 TABLET ORAL DAILY
Status: DISCONTINUED | OUTPATIENT
Start: 2022-10-06 | End: 2022-10-07

## 2022-10-06 RX ADMIN — OXYCODONE HYDROCHLORIDE AND ACETAMINOPHEN 1 TABLET: 5; 325 TABLET ORAL at 02:10

## 2022-10-06 RX ADMIN — FUROSEMIDE 40 MG: 10 INJECTION, SOLUTION INTRAMUSCULAR; INTRAVENOUS at 12:10

## 2022-10-06 RX ADMIN — FOLIC ACID 1 MG: 1 TABLET ORAL at 08:10

## 2022-10-06 RX ADMIN — OXYCODONE HYDROCHLORIDE AND ACETAMINOPHEN 1 TABLET: 5; 325 TABLET ORAL at 08:10

## 2022-10-06 RX ADMIN — AMLODIPINE BESYLATE 5 MG: 5 TABLET ORAL at 08:10

## 2022-10-06 RX ADMIN — THERA TABS 1 TABLET: TAB at 08:10

## 2022-10-06 NOTE — H&P
[facility] Hospital Medicine   History & Physical Note      Patient Name: Katharina Larson  : 1960  MRN: [id number]  PCP: Primary Doctor No  Admitting Service: Hospital Medicine  Attending Physician: Robi Rodriguez MD  Admission Date: 10/5/2022 - IP- Inpatient   Length of Stay: 1  History source: EMR, patient and/or patient's family  Code status: Full    Chief Complaint   Fall (Trip/fall today. Abrasions to bilateral knees & elbows. States she did hit her head, no head trauma, -LOC, -BT. Denies LOC. EMS was called to [facility] yesterday for a fall and was on the floor 2 days, refused ambulance transfer at that time. Fall again today at approx 0400. )      History of Present Illness   Ms. Larson is an obese 61-year-old female with history of hepatitis-C, treated in , tobacco and alcohol use presents to the ED via EMS with c/o bilateral knee pain after sustaining 2 ground level falls in the past 48 hours.  Patient states that she tripped and fell and was too weak to get up yesterday and called EMS and they were able to help her get back up and she declined coming to the ED for evaluation, EMS reported that she was laying in her urine and feces.  However early this morning around 0400 she went to the bathroom and got tripped up by her cat and she fell again, but  did not have her phone by her and she was too weak to move so she was on the ground for nearly 12 hours.  When EMS arrived she was hypoxic with an SpO2 of 93% and placed on 2 L nasal cannula.  Patient denies chest pain, palpitations, unilateral weakness, vision changes prior to her falls, she is adamant that she just tripped and fell and was too weak to get up.    Patient does not have a PCP and she is not currently on any medications.  She states she had hypertension at 1 time but that was several years ago.  She endorses dyspnea on exertion, orthopnea having to use 3 and 4 pillows to sleep at night, generalized edema and  abdominal swelling that is increasing in size over the past several months.  Patient states she drinks 3-4 glasses of wine 2-3 days a week. She smokes 1 ppd for several years.     Initial ED VS:  Temperature 97.9°, heart rate 108, respirations 24, blood pressure 146/80, oxygenation 96% on 2 L NC.  CT head negative for acute intracranial findings.  X-ray of the left knee shows degenerative changes with a small joint effusion, chest x-ray showed pulmonary vascular congestion.  Laboratory work remarkable for  Platelets 60, PT 16.2, INR 1.32, magnesium 1.5, bilirubin 2.9, AST 87,  CRP 24, sed rate 29, lactic acid 2.3 with repeat lactic acid 4 hours later resolved, serum alcohol level 32, BNP 68. Patient was given 1 L of normal saline in the ED.  She was also started on vancomycin for possible sepsis from bilateral lower extremity cellulitis.      Patient seen and examined this evening with myself and Dr. Felix.  She endorses a history of hep C that was treated twice in 2015 and then again in 2016 with a clinical trial agent???. She is unsure how she got hepatitis-C, as she denies IV drug use or hx blood transfusion. Her lower extremities do not appeared to have cellulitis however she does have wounds with scabs to bilateral knees from being on the ground, they do not appear to be infected.      ROS  Except as documented, all other systems reviewed and negative     Past Medical History   Hepatitis C reportedly treated in 2015  Alcohol use   Tobacco use  Essential HTN, not on meds    Past Surgical History    right total knee replacement    Social History   Smokes 1 ppd for several years  Pt states she drinks 3-4 glasses of wine 2-3 days a week  Denies illicit drug use  Family History   Reviewed and negative    Allergies   Patient has no known allergies.    Home Medications     Prior to Admission medications    Not on File        Inpatient Medications   Scheduled Meds   folic acid  1 mg Oral Daily    multivitamin  1  tablet Oral Daily    Banana bag   Intravenous Once     Continuous Infusions    PRN Meds  acetaminophen, aluminum-magnesium hydroxide-simethicone, bisacodyL, chlordiazepoxide, ondansetron, oxyCODONE-acetaminophen, polyethylene glycol, prochlorperazine, senna-docusate 8.6-50 mg, sodium chloride 0.9%, trazodone    Physical Exam   Vital Signs  Temp:  [97.9 °F (36.6 °C)-98.2 °F (36.8 °C)]   Pulse:  [102-110]   Resp:  [19-24]   BP: (146-181)/(77-94)   SpO2:  [92 %-96 %]    General: Appears uncomfortable, supplemental o2 in nares, anasarca  HEENT: NC/AT  Neck:  No JVD  Chest: crackles to bilateral bases  CVS: Regular rhythm. Normal S1/S2.  Abdomen: nondistended, normoactive BS, soft and non-tender, moderate ascites, not tense, + fluid wave.   MSK: No obvious deformity or joint swelling  Skin: pressure wounds with scabs to bilateral knees and right elbow with serous drainage, good pedal pulses bilaterally  Neuro: AAOx3, no focal neurological deficit  Psych: Cooperative    Labs     Recent Labs     10/05/22  1802   WBC 7.6   RBC 4.22   HGB 13.7   HCT 40.7   MCV 96.4*   MCH 32.5*   MCHC 33.7   RDW 14.4   PLT 60*     Recent Labs     10/05/22  1802 10/05/22  2238   LACTIC 2.3* 1.1     Recent Labs     10/05/22  1802   INR 1.32*     No results for input(s): HGBA1C, CHOL, TRIG, LDL, VLDL, HDL in the last 72 hours.   Recent Labs     10/05/22  1802      K 3.6   CHLORIDE 102   CO2 26   BUN 8.5*   CREATININE 0.51*   GLUCOSE 135*   CALCIUM 8.2*   MG 1.50*   ALBUMIN 3.0*   GLOBULIN 3.5   ALKPHOS 75   ALT 40   AST 87*   BILITOT 2.9*   LIPASE 24   CRP 24.20*     Recent Labs     10/05/22  1648 10/05/22  1802   BNP  --  68.5   CPK 2,006*  --    TROPONINI  --  0.023          Microbiology Results (last 7 days)       Procedure Component Value Units Date/Time    Blood Culture #2 **CANNOT BE ORDERED STAT** [[id number]] Collected: 10/05/22 1802    Order Status: Resulted Specimen: Blood Updated: 10/05/22 1820    Blood Culture #1 **CANNOT BE  ORDERED STAT** [[id number]] Collected: 10/05/22 1802    Order Status: Resulted Specimen: Blood Updated: 10/05/22 1820           Imaging     CT Head Without Contrast   Final Result      No acute abnormality seen         Electronically signed by: Joshua Dupree   Date:    10/05/2022   Time:    17:52      CT Cervical Spine Without Contrast   Final Result      Loss of the normal lordotic curve of the cervical spine most likely related to spasm but otherwise unremarkable with no evidence of acute fracture or dislocation seen         Electronically signed by: Joshua Dupree   Date:    10/05/2022   Time:    17:49      X-Ray Knee Complete 4 or More Views Left   Final Result      Degenerative changes with small joint effusion.         Electronically signed by: Danyell Mckinney   Date:    10/05/2022   Time:    16:57      X-Ray Knee Complete 4 Or More Views Right   Final Result      No acute abnormality identified.         Electronically signed by: Danyell Mckinney   Date:    10/05/2022   Time:    16:57      X-Ray Chest AP Portable   Final Result      Prominent interstitial markings may represent pulmonary venous congestion.         Electronically signed by: Danyell Mckinney   Date:    10/05/2022   Time:    16:56      US Abdomen Limited    (Results Pending)     Assessment & Plan   Acute hypoxic respiratory failure, multifactorial  Pulmonary Vascular Congestion, r/o CHF  Anasarca/Ascites, Coagulopathy/thrombocytopenia, r/o cirrhosis  Alcohol Use/Abuse  Hyperbilirubinemia and Elevated AST  Hypomagnesemia  Atraumatic Rhabdomysis s/p 1 liter IVF given in ED  Ground Level Falls with BLE weakness  Superficial Wound to Bilateral Knees/Right Elbow  Essential HTN  Morbid Obesity    PLAN:  - Echocardiogram. Lasix 40mg IV x1, monitor I&Os  - Supplemental 02 to keep sats >92%.  - RUQ US, Hep Panel, Viral HCV Marcos to assess for Hep C status  - CIWA Q8H. Monitor closely for DTs  - MVI daily, thiamine daily.  - Mag replaced in the ED.    - PRN antihypertensives.  Start Norvasc daily 5mg.  - There is concern that the patient drinks more than she is admitting as she was on the ground for 12 hours and her alcohol level was 23 on admit  - Wound care consult for knee wounds. Bcx2 obtained in ED and pt given IV Vanc x1 dose, wound dont appear infected, no leukocytosis, will hold off on abx and monitor.   - PT/OT consult  - CBC, CMP, Mag, Phos  - VTE Prophylaxis: SCDs, no AC due to low plt    IYocasta, NAYP-C have discussed this patients case with Dr. Felix who agrees with the diagnosis and treatment plan.      __________________________________________________________________________  I, Dr. Ag Felix assumed care of this patient.  For the patient encounter, I performed the substantive portion of the visit, I reviewed the NPPA documentation, treatment plan, and medical decision making.  I had face to face time with this patient.  I have personally reviewed the labs and test results that are presently available. I have reviewed or attempted to review medical records based upon their availability. If patient was admitted under observational status it is with my approval.      61-year-old female presents with falls and found to have anasarca and vascular congestion on chest x-ray.  She does endorse alcohol intake 3-4 days a week up to a bottle of wine.  She has a distended abdomen on exam consistent with ascites, bibasilar crackles, anasarca, and agree with remainder of above exam.  Recommend liver ultrasound, echocardiogram, monitor for signs of alcohol withdrawal but none this time, IV Lasix, and management as above    Time seen: 11PM   Critical care time: 35 min; Critical care diagnosis:hypoxia  Ag Felix MD     Extended care-Dr gill 10-6-2 at 8:20 pm  Will consult PT. Was able to get out of bed to bathroom w assistance. Her lower extremity wounds look chronic only meaning that she falls frequently. Black eschar to  bilateral knees. Am labs and continue present management

## 2022-10-07 LAB
ALBUMIN SERPL-MCNC: 2.7 GM/DL (ref 3.4–4.8)
ALBUMIN/GLOB SERPL: 0.8 RATIO (ref 1.1–2)
ALP SERPL-CCNC: 69 UNIT/L (ref 40–150)
ALT SERPL-CCNC: 29 UNIT/L (ref 0–55)
AST SERPL-CCNC: 38 UNIT/L (ref 5–34)
BASOPHILS # BLD AUTO: 0.03 X10(3)/MCL (ref 0–0.2)
BASOPHILS NFR BLD AUTO: 0.6 %
BILIRUBIN DIRECT+TOT PNL SERPL-MCNC: 1.5 MG/DL
BUN SERPL-MCNC: 9.5 MG/DL (ref 9.8–20.1)
CALCIUM SERPL-MCNC: 8.3 MG/DL (ref 8.4–10.2)
CHLORIDE SERPL-SCNC: 99 MMOL/L (ref 98–107)
CK SERPL-CCNC: 315 U/L (ref 29–168)
CO2 SERPL-SCNC: 36 MMOL/L (ref 23–31)
CREAT SERPL-MCNC: 0.54 MG/DL (ref 0.55–1.02)
EOSINOPHIL # BLD AUTO: 0.18 X10(3)/MCL (ref 0–0.9)
EOSINOPHIL NFR BLD AUTO: 3.6 %
ERYTHROCYTE [DISTWIDTH] IN BLOOD BY AUTOMATED COUNT: 14 % (ref 11.5–17)
GFR SERPLBLD CREATININE-BSD FMLA CKD-EPI: >60 MLS/MIN/1.73/M2
GLOBULIN SER-MCNC: 3.4 GM/DL (ref 2.4–3.5)
GLUCOSE SERPL-MCNC: 104 MG/DL (ref 82–115)
HCT VFR BLD AUTO: 38.8 % (ref 37–47)
HCV RNA SERPL NAA+PROBE-ACNC: NORMAL IU/ML
HGB BLD-MCNC: 12.6 GM/DL (ref 12–16)
IMM GRANULOCYTES # BLD AUTO: 0.02 X10(3)/MCL (ref 0–0.04)
IMM GRANULOCYTES NFR BLD AUTO: 0.4 %
LYMPHOCYTES # BLD AUTO: 0.7 X10(3)/MCL (ref 0.6–4.6)
LYMPHOCYTES NFR BLD AUTO: 13.8 %
MAGNESIUM SERPL-MCNC: 1.9 MG/DL (ref 1.6–2.6)
MCH RBC QN AUTO: 32.6 PG (ref 27–31)
MCHC RBC AUTO-ENTMCNC: 32.5 MG/DL (ref 33–36)
MCV RBC AUTO: 100.3 FL (ref 80–94)
MONOCYTES # BLD AUTO: 0.46 X10(3)/MCL (ref 0.1–1.3)
MONOCYTES NFR BLD AUTO: 9.1 %
NEUTROPHILS # BLD AUTO: 3.7 X10(3)/MCL (ref 2.1–9.2)
NEUTROPHILS NFR BLD AUTO: 72.5 %
NRBC BLD AUTO-RTO: 0 %
PATH REV: NORMAL
PLATELET # BLD AUTO: 49 X10(3)/MCL (ref 130–400)
PMV BLD AUTO: 12.2 FL (ref 7.4–10.4)
POTASSIUM SERPL-SCNC: 4.4 MMOL/L (ref 3.5–5.1)
PROT SERPL-MCNC: 6.1 GM/DL (ref 5.8–7.6)
RBC # BLD AUTO: 3.87 X10(6)/MCL (ref 4.2–5.4)
SODIUM SERPL-SCNC: 139 MMOL/L (ref 136–145)
WBC # SPEC AUTO: 5.1 X10(3)/MCL (ref 4.5–11.5)

## 2022-10-07 PROCEDURE — 83735 ASSAY OF MAGNESIUM: CPT | Performed by: INTERNAL MEDICINE

## 2022-10-07 PROCEDURE — 25000003 PHARM REV CODE 250: Performed by: NURSE PRACTITIONER

## 2022-10-07 PROCEDURE — 63600175 PHARM REV CODE 636 W HCPCS: Performed by: INTERNAL MEDICINE

## 2022-10-07 PROCEDURE — 82550 ASSAY OF CK (CPK): CPT | Performed by: INTERNAL MEDICINE

## 2022-10-07 PROCEDURE — 85025 COMPLETE CBC W/AUTO DIFF WBC: CPT | Performed by: INTERNAL MEDICINE

## 2022-10-07 PROCEDURE — 80053 COMPREHEN METABOLIC PANEL: CPT | Performed by: INTERNAL MEDICINE

## 2022-10-07 PROCEDURE — 21400001 HC TELEMETRY ROOM

## 2022-10-07 PROCEDURE — 36415 COLL VENOUS BLD VENIPUNCTURE: CPT | Performed by: INTERNAL MEDICINE

## 2022-10-07 PROCEDURE — 25000003 PHARM REV CODE 250: Performed by: INTERNAL MEDICINE

## 2022-10-07 RX ORDER — MORPHINE SULFATE 4 MG/ML
2 INJECTION, SOLUTION INTRAMUSCULAR; INTRAVENOUS ONCE
Status: COMPLETED | OUTPATIENT
Start: 2022-10-07 | End: 2022-10-07

## 2022-10-07 RX ORDER — MORPHINE SULFATE 4 MG/ML
2 INJECTION, SOLUTION INTRAMUSCULAR; INTRAVENOUS ONCE
Status: DISCONTINUED | OUTPATIENT
Start: 2022-10-07 | End: 2022-10-07

## 2022-10-07 RX ORDER — SPIRONOLACTONE 25 MG/1
50 TABLET ORAL DAILY
Status: DISCONTINUED | OUTPATIENT
Start: 2022-10-08 | End: 2022-10-09

## 2022-10-07 RX ORDER — LIDOCAINE HYDROCHLORIDE 10 MG/ML
20 INJECTION, SOLUTION EPIDURAL; INFILTRATION; INTRACAUDAL; PERINEURAL ONCE
Status: DISCONTINUED | OUTPATIENT
Start: 2022-10-07 | End: 2022-10-07

## 2022-10-07 RX ORDER — THIAMINE HCL 100 MG
100 TABLET ORAL DAILY
Status: DISCONTINUED | OUTPATIENT
Start: 2022-10-08 | End: 2022-10-10 | Stop reason: HOSPADM

## 2022-10-07 RX ORDER — MORPHINE SULFATE 4 MG/ML
2 INJECTION, SOLUTION INTRAMUSCULAR; INTRAVENOUS ONCE
Status: DISCONTINUED | OUTPATIENT
Start: 2022-10-07 | End: 2022-10-10 | Stop reason: HOSPADM

## 2022-10-07 RX ORDER — MORPHINE SULFATE 4 MG/ML
2 INJECTION, SOLUTION INTRAMUSCULAR; INTRAVENOUS ONCE
Status: CANCELLED | OUTPATIENT
Start: 2022-10-07

## 2022-10-07 RX ORDER — FUROSEMIDE 40 MG/1
40 TABLET ORAL DAILY
Status: DISCONTINUED | OUTPATIENT
Start: 2022-10-08 | End: 2022-10-10 | Stop reason: HOSPADM

## 2022-10-07 RX ORDER — CARVEDILOL 3.12 MG/1
3.12 TABLET ORAL 2 TIMES DAILY
Status: DISCONTINUED | OUTPATIENT
Start: 2022-10-07 | End: 2022-10-08

## 2022-10-07 RX ADMIN — AMLODIPINE BESYLATE 5 MG: 5 TABLET ORAL at 11:10

## 2022-10-07 RX ADMIN — MORPHINE SULFATE 2 MG: 4 INJECTION INTRAVENOUS at 07:10

## 2022-10-07 RX ADMIN — FOLIC ACID 1 MG: 1 TABLET ORAL at 11:10

## 2022-10-07 RX ADMIN — CARVEDILOL 3.12 MG: 3.12 TABLET, FILM COATED ORAL at 11:10

## 2022-10-07 RX ADMIN — OXYCODONE HYDROCHLORIDE AND ACETAMINOPHEN 1 TABLET: 5; 325 TABLET ORAL at 11:10

## 2022-10-07 RX ADMIN — COLLAGENASE SANTYL: 250 OINTMENT TOPICAL at 09:10

## 2022-10-07 RX ADMIN — THERA TABS 1 TABLET: TAB at 11:10

## 2022-10-07 RX ADMIN — LIDOCAINE HYDROCHLORIDE AND EPINEPHRINE 20 ML: 15; 5 INJECTION, SOLUTION EPIDURAL; INFILTRATION; INTRACAUDAL; PERINEURAL at 06:10

## 2022-10-07 NOTE — CONSULTS
[facility]   Consult Note  Trauma and Acute Care Surgery    Patient Name: Katharina Larson  YOB: 1960  Date of Admission: 10/5/2022  Date: 10/07/2022 2:34 PM  Consulting Service: trauma surgery  Reason for Consult: wound debridement  HD#2  POD#* No surgery found *    PRESENTING HISTORY     Chief Complaint/Reason for Admission: <principal problem not specified>    History of Present Illness:  Ms. Larson is an obese 61-year-old female with history of hepatitis-C, treated in 2015, tobacco and alcohol use presents to the ED via EMS with c/o bilateral knee pain after sustaining 2 ground level falls in the past 48 hours.  Patient states that she tripped and fell and was too weak to get up yesterday and called EMS and they were able to help her get back up and she declined coming to the ED for evaluation, EMS reported that she was laying in her urine and feces.  However early this morning around 0400 she went to the bathroom and got tripped up by her cat and she fell again, but  did not have her phone by her and she was too weak to move so she was on the ground for nearly 12 hours.  When EMS arrived she was hypoxic with an SpO2 of 93% and placed on 2 L nasal cannula.  Patient denies chest pain, palpitations, unilateral weakness, vision changes prior to her falls, she is adamant that she just tripped and fell and was too weak to get up.    Review of Systems:  12 point ROS negative except as stated in HPI    PAST HISTORY:   Past medical history:  Past Medical History:   Diagnosis Date    Unspecified viral hepatitis C without hepatic coma      Past Medical History:   Diagnosis Date    Unspecified viral hepatitis C without hepatic coma        Past surgical history:  History reviewed. No pertinent surgical history.  History reviewed. No pertinent surgical history.    Family history:  History reviewed. No pertinent family history.    Social history:  Social History     Socioeconomic History    Marital  status:    Tobacco Use    Smoking status: Every Day     Types: Cigarettes    Smokeless tobacco: Never   Substance and Sexual Activity    Alcohol use: Yes     Social History     Tobacco Use   Smoking Status Every Day    Types: Cigarettes   Smokeless Tobacco Never         MEDICATIONS & ALLERGIES:   Allergies: Review of patient's allergies indicates:  No Known Allergies    No current facility-administered medications on file prior to encounter.     No current outpatient medications on file prior to encounter.       Scheduled Meds:   amLODIPine  5 mg Oral Daily    folic acid  1 mg Oral Daily    multivitamin  1 tablet Oral Daily    Banana bag   Intravenous Once       Continuous Infusions:    PRN Meds:acetaminophen, aluminum-magnesium hydroxide-simethicone, bisacodyL, chlordiazepoxide, labetalol, ondansetron, oxyCODONE-acetaminophen, polyethylene glycol, prochlorperazine, senna-docusate 8.6-50 mg, sodium chloride 0.9%, trazodone    OBJECTIVE:     Vital Signs:  Temp:  [97.7 °F (36.5 °C)-99 °F (37.2 °C)] 98 °F (36.7 °C)  Pulse:  [83-96] 87  Resp:  [16-20] 16  SpO2:  [95 %-98 %] 95 %  BP: (108-159)/() 132/83  Body mass index is 39.16 kg/m².     I/O last 3 completed shifts:  In: 1440 [P.O.:1440]  Out: 1050 [Urine:1050]  I/O this shift:  In: 780 [P.O.:780]  Out: 1000 [Urine:1000]    Physical Exam:  General:  Well developed, well nourished, no acute distress  HEENT:  Normocephalic, atraumatic  CVS:  RR  Resp:  on 3L NC, normal work of breathing,  GI:  Abdomen soft, non-tender, non-distended  :  Deferred  MSK:  moving all extremities spontaneously  Skin:  pressure wounds w/ scabs to b/l knees and left elbow w/ serous drainage  Neuro:  CNII-XII grossly intact, alert and oriented to person, place, and time    Laboratory:  Recent Labs     10/05/22  1802 10/06/22  0500 10/07/22  0555   WBC 7.6 6.6 5.1   HGB 13.7 13.5 12.6   HCT 40.7 41.4 38.8   PLT 60* 50* 49*   PTT 29.8  --   --    INR 1.32*  --   --      Recent Labs      10/05/22  1802 10/06/22  0500 10/07/22  0555    138 139   K 3.6 3.4* 4.4   CO2 26 30 36*   BUN 8.5* 7.7* 9.5*   CREATININE 0.51* 0.56 0.54*   CALCIUM 8.2* 8.0* 8.3*   MG 1.50* 1.70 1.90   PHOS  --  3.4  --    ALBUMIN 3.0* 3.0* 2.7*   BILITOT 2.9* 3.3* 1.5   AST 87* 70* 38*   ALKPHOS 75 76 69   ALT 40 37 29     Troponin:  Recent Labs     10/05/22  1802   TROPONINI 0.023     CBC:  Recent Labs     10/05/22  1802 10/06/22  0500 10/07/22  0555   WBC 7.6 6.6 5.1   RBC 4.22 4.21 3.87*   HGB 13.7 13.5 12.6   HCT 40.7 41.4 38.8   PLT 60* 50* 49*   MCV 96.4* 98.3* 100.3*   MCH 32.5* 32.1* 32.6*   MCHC 33.7 32.6* 32.5*     CMP:  Recent Labs     10/05/22  1802 10/06/22  0500 10/07/22  0555   CALCIUM 8.2* 8.0* 8.3*   ALBUMIN 3.0* 3.0* 2.7*    138 139   K 3.6 3.4* 4.4   CO2 26 30 36*   BUN 8.5* 7.7* 9.5*   CREATININE 0.51* 0.56 0.54*   ALKPHOS 75 76 69   ALT 40 37 29   AST 87* 70* 38*   BILITOT 2.9* 3.3* 1.5     Lactic Acid:  No results for input(s): LACTATE in the last 72 hours.  Etoh:  No results for input(s): ALCOHOLMEDIC in the last 72 hours.  Drug Screen:  No results for input(s): PCDSCOMETHA, COCAINEMETAB, OPIATESCREEN, BARBITURATES, AMPHETAMINES, MARIJUANATHC, PCDSOPHENCYN, CREATRANDUR, TOXINFO in the last 72 hours.    ABG:  No results for input(s): PH, PCO2, PO2, HCO3, BE, POCSATURATED in the last 72 hours.    Diagnostic Results:  No results found in the last 24 hours.  US Abdomen Limited   Final Result      1. Cirrhosis.   2. Patent portal vein.   3. No significant ascites today.         Electronically signed by: Medardo Luke   Date:    10/06/2022   Time:    08:27      CT Head Without Contrast   Final Result      No acute abnormality seen         Electronically signed by: Joshua Dupree   Date:    10/05/2022   Time:    17:52      CT Cervical Spine Without Contrast   Final Result      Loss of the normal lordotic curve of the cervical spine most likely related to spasm but otherwise unremarkable with no  evidence of acute fracture or dislocation seen         Electronically signed by: Joshua Dupree   Date:    10/05/2022   Time:    17:49      X-Ray Knee Complete 4 or More Views Left   Final Result      Degenerative changes with small joint effusion.         Electronically signed by: Danyell Mckinney   Date:    10/05/2022   Time:    16:57      X-Ray Knee Complete 4 Or More Views Right   Final Result      No acute abnormality identified.         Electronically signed by: Danyell Mckinney   Date:    10/05/2022   Time:    16:57      X-Ray Chest AP Portable   Final Result      Prominent interstitial markings may represent pulmonary venous congestion.         Electronically signed by: Danyell Mckinney   Date:    10/05/2022   Time:    16:56          Microbiology:  Microbiology Results (last 7 days)       Procedure Component Value Units Date/Time    Blood Culture #2 **CANNOT BE ORDERED STAT** [[id number]]  (Normal) Collected: 10/05/22 1802    Order Status: Completed Specimen: Blood Updated: 10/06/22 2002     CULTURE, BLOOD ([facility]) No Growth At 24 Hours    Blood Culture #1 **CANNOT BE ORDERED STAT** [[id number]]  (Normal) Collected: 10/05/22 1802    Order Status: Completed Specimen: Blood Updated: 10/06/22 2002     CULTURE, BLOOD ([facility]) No Growth At 24 Hours             ASSESSMENT & PLAN:   Ms. Larson is a 62 yo F presents s/p GLF w/ b/l knee wounds.     Plan:  -plan bedside debridement to R knee  -continue local wound care to L knee  -reconsult wound care for R knee    Ann Leo MD  Trauma/Acute Care Surgery  10/7/2022  2:34 PM

## 2022-10-07 NOTE — PROGRESS NOTES
[facility]  [facility]ound Care    Patient Name:  Katharina Larson  MRN:  [id number]  Date: 10/7/2022  Diagnosis: <principal problem not specified>    History:     She went to the bathroom and got tripped up by her cat and she fell again, but  did not have her phone by her and she was too weak to move so she was on the ground for nearly 12 hours.      Past Medical History:   Diagnosis Date    Unspecified viral hepatitis C without hepatic coma        Social History     Socioeconomic History    Marital status:    Tobacco Use    Smoking status: Every Day     Types: Cigarettes    Smokeless tobacco: Never   Substance and Sexual Activity    Alcohol use: Yes       Allergies as of 10/05/2022    (No Known Allergies)       Need for Treatment:     Patient tolerated today's treatment without any adverse effects.      [facility] Assessment Details:        10/07/22 1347        Altered Skin Integrity 10/05/22 Right anterior Knee   Date First Assessed: 10/05/22   Altered Skin Integrity Present on Admission: yes  Side: Right  Orientation: anterior  Location: Knee   Wound Image    Description of Altered Skin Integrity Full thickness tissue loss. Base is covered by slough and/or eschar in the wound bed   Dressing Appearance Open to air   Drainage Amount None   Appearance Black   Tissue loss description Full thickness   Black (%), Wound Tissue Color 100 %   Periwound Area Swelling;Redness   Wound Edges Defined   Wound Length (cm) 7 cm   Wound Width (cm) 6 cm   Wound Surface Area (cm^2) 42 cm^2   Care Cleansed with:;Sterile normal saline        Altered Skin Integrity 10/05/22 Left anterior Knee   Date First Assessed: 10/05/22   Altered Skin Integrity Present on Admission: yes  Side: Left  Orientation: anterior  Location: Knee   Wound Image    Description of Altered Skin Integrity Full thickness tissue loss. Base is covered by slough and/or eschar in the wound bed   Dressing Appearance Open to air    Drainage Amount None   Appearance Black;Eschar   Tissue loss description Full thickness   Black (%), Wound Tissue Color 100 %   Periwound Area Redness;Swelling;Warm   Wound Edges Defined   Wound Length (cm) 5 cm   Wound Width (cm) 7 cm   Wound Surface Area (cm^2) 35 cm^2   Care Cleansed with:;Sterile normal saline        Altered Skin Integrity 10/05/22 Left posterior Elbow   Date First Assessed: 10/05/22   Altered Skin Integrity Present on Admission: yes  Side: Left  Orientation: posterior  Location: Elbow   Wound Image    Description of Altered Skin Integrity Full thickness tissue loss. Subcutaneous fat may be visible but bone, tendon or muscle are not exposed   Drainage Amount Small   Drainage Characteristics/Odor Serous   Appearance Pink;Yellow   Tissue loss description Full thickness   Yellow (%), Wound Tissue Color 100 %   Periwound Area Redness;Swelling   Wound Edges Defined   Wound Length (cm) 3 cm   Wound Width (cm) 2 cm   Wound Surface Area (cm^2) 6 cm^2   Care Cleansed with:;Sterile normal saline   Dressing Applied;Foam        Altered Skin Integrity 10/05/22 Right posterior Elbow   Date First Assessed: 10/05/22   Altered Skin Integrity Present on Admission: yes  Side: Right  Orientation: posterior  Location: Elbow   Wound Image    Description of Altered Skin Integrity Partial thickness tissue loss. Shallow open ulcer with a red or pink wound bed, without slough. Intact or Open/Ruptured Serum-filled blister.   Dressing Appearance Dry;Intact   Drainage Amount Scant   Drainage Characteristics/Odor Serous   Red (%), Wound Tissue Color 100 %   Periwound Area Redness;Swelling   Wound Edges Defined   Wound Length (cm) 0.5 cm   Wound Width (cm) 0.5 cm   Wound Surface Area (cm^2) 0.25 cm^2   Care Cleansed with:;Sterile normal saline   Dressing Applied        Altered Skin Integrity 10/07/22 1347 Left lower;lateral Leg Full thickness tissue loss. Subcutaneous fat may be visible but bone, tendon or muscle are not  exposed   Date First Assessed/Time First Assessed: 10/07/22 1347   Altered Skin Integrity Present on Admission: yes  Side: Left  Orientation: lower;lateral  Location: Leg  Description of Altered Skin Integrity: Full thickness tissue loss. Subcutaneous fat may b...   Wound Image    Description of Altered Skin Integrity Full thickness tissue loss. Base is covered by slough and/or eschar in the wound bed   Dressing Appearance Open to air   Drainage Amount None   Appearance Black   Black (%), Wound Tissue Color 100 %   Periwound Area Redness;Swelling   Wound Edges Defined   Wound Length (cm) 1 cm   Wound Width (cm) 2.5 cm   Wound Surface Area (cm^2) 2.5 cm^2   Care Cleansed with:;Sterile normal saline   Dressing Applied         Treatment:     Clean technique maintained throughout treatment.   Previous dressing removed avoiding trauma to the wound bed.   Patient educated on appropriate WOC care. Patient verbalizes understanding.      Plan:       Recommendations given to Nurse Teresita PARK for consult to surgery to evaluate. Bilateral knee/ elbows: Cleanse with NS. Apply santyl, cover with moistened 4x4, abd pad, secure with tape. Change BID and PRN. Will follow up in a few days.       10/07/2022

## 2022-10-07 NOTE — PT/OT/SLP PROGRESS
"Galileo Mayberry is a 76 year old male who is being evaluated via a billable telephone visit.      The patient has been notified of following:     \"This telephone visit will be conducted via a call between you and your physician/provider. We have found that certain health care needs can be provided without the need for a physical exam.  This service lets us provide the care you need with a short phone conversation.  If a prescription is necessary we can send it directly to your pharmacy.  If lab work is needed we can place an order for that and you can then stop by our lab to have the test done at a later time.    Telephone visits are billed at different rates depending on your insurance coverage. During this emergency period, for some insurers they may be billed the same as an in-person visit.  Please reach out to your insurance provider with any questions.    If during the course of the call the physician/provider feels a telephone visit is not appropriate, you will not be charged for this service.\"    Patient has given verbal consent for Telephone visit?  Yes    What phone number would you like to be contacted at? [phone]    How would you like to obtain your AVS? 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 11/2019, he is diagnosed with ocular melanoma, after a choroidal lesion was found in his Left eye.  Patient reports over the last several months he developed new floaters/spots in his L eye.  2. 11/25/2019, he was seen by ophthalmology who noted a left eye, elevated bilobed lesion, size 6.28mm x 18.31(T) x 17.04(L).   3. 12/14/19, CT-CAP showed no evidence of metastatic disease.  4. 1/10/2020: Patient referred to [facility]. B-scan " Pt refused PT eval due to B knee pain and wanting to speak with MD about her test results. Spoke with nurse. Will follow up tomorrow per pt request. Apparently she lives on the second floor of her apt and needs to be independent to return home. May need rehab.    ultrasound of left eye showed elevated bilobed lesion measuring 7.2-7.3 height with a base of 21.4 x 22.5 mm. Low to medium reflectivity. Difficult measurements due to location. Ciliary body involvement was noted. Ultrasound basal dimension measurement included subretinal fluid, and clinical measurement had to be done by transillumination due to anterior tumor location.  5. 1/15/2020, visual acuity right eye 20/20 -1, left eye 20/25 +2 nh. Visual fields counting fingers full bilaterally. Clinical fundus exam of left eye revealed choroidal/ciliary body malignant melanoma with basal measurement of 22 x 21 mm and height of 7.5 mm. Located in the Inferior left eye from 4:30-7:30, 15 mm to disc, 15 mm to fovea, +subretinal fluid, bilobed. The tumor without fluid appears to be 1-2 mm smaller in basal dimension (20 x 19 mm)  6. 2/10/2020, Iodine-125 24 mm plaque placement delivering 85 Gy to an 8 mm height.  7. 3/31/2020, he starts adjuvant sunitinib 25 mg daily for high risk disease, Class II molecular signature and PRAME mRNA positive, high-risk of early metastasis. He completed 6 months of therapy.  8. 8/26/2020, MRI-liver shows interval development of numerous (more than 30) metastatic lesions in the liver in bilobar distribution. The largest in segment JUAN measures 1.85 cm.  9. 10/19/2020, he undergoes Y90 radioembolization with 72.2 mCi of Theraspheres to the right lobe of the liver; predominantly in hepatic segments 5 and 6, milder uptake in the region of segment 8  10. 10/22/20, MR abdomen showed significantly increased in size and numbers of the innumerable  metastatic liver lesions since 8/26/2020 as well as multiple metastatic bone lesions in the visualized thoracolumbar spine and pelvic bones. We also obtainined his PD-L1 testing result, and it did show PD-L1 negative disease.  11. 10/23/2020, first cycle of Ipilimumab 1mg/kg and nivolumab 3mg/kg initiated with palliative intent.  12. 11/13/2020 C2  "completed.  13. Y-90 treatment 11/16 with Dr. Storey.    Favian calls into clinic today to check in on how C2 of ipilimumab/nivolumab and Y-90 treatment.     Interim History:  Favian reports that his treatment with Dr. Storey 11/16 went \"about as good as can be expected.\" He states that his C2 of immunotherapy (11/13) went well 11/13 through yesterday, however began to develop some nausea and fatigue today. He reports that week 2 of C1 was really hard on him and he feels anxious that this is starting to happen again. He developed nausea this morning and took Zofran (had not needed it days 1-5). He denies abdominal pain, vomiting. He mentions he has had regular bowel movements now that he has been on a regimen of taking Miralax and Senna twice daily. He states his appetite has also been decent until this morning. He was able to eat some eggs, but didn't necessarily feel hungry for them. He recalls having to force himself to eat during week 2 of the last cycle. He mentions he has lost 25lbs since his diagnosis. Favian states that his mood has been \"okay\", but he feels pretty anxious and apprehensive about treatments, since he felt so poorly after C1. He is receptive to meeting with Palliative care for extra support. His prognosis continues to be difficult to deal with.     Hunter mentions that last night he experienced increased urinary frequency and that he had to get up to go to the bathroom seven times, which is way above his baseline of two times nightly. He denies dysuria, hematuria or dark urine. No fevers, chills, back pain or flank pain.    Favian denies chest pain, palpitations, shortness of breath, cough. No headaches, tremors, lightheadedness, dizziness. No recent skin rashes or lesions.    Current Outpatient Medications   Medication Sig Dispense Refill     acetaminophen (TYLENOL) 500 MG tablet Take 1,000 mg by mouth       Ascorbic Acid (VITAMIN C) 500 MG CAPS Take 500 mg by mouth       Cholecalciferol (VITAMIN " D3) 25 MCG (1000 UT) CAPS Takes 3 daily       diclofenac (VOLTAREN) 1 % topical gel        Ferrous Gluconate 240 (27 Fe) MG TABS Take 240 mg by mouth       finasteride (PROSCAR) 5 MG tablet Take 5 mg by mouth       HYDROmorphone (DILAUDID) 2 MG tablet Take 1 tablet (2 mg) by mouth every 6 hours as needed for pain 10 tablet 0     LORazepam (ATIVAN) 0.5 MG tablet Take 1 tablet (0.5 mg) by mouth every 4 hours as needed (Anxiety, Nausea/Vomiting or Sleep) 30 tablet 3     methylphenidate (RITALIN) 10 MG tablet as needed       methylPREDNISolone (MEDROL DOSEPAK) 4 MG tablet therapy pack Take 1 tablet (4 mg) by mouth 2 times daily Take as directed on package. 21 tablet 0     methylPREDNISolone (MEDROL DOSEPAK) 4 MG tablet therapy pack Take 1 tablet (4 mg) by mouth 2 times daily Take as directed on package. 21 tablet 0     methylPREDNISolone (MEDROL) 32 MG tablet Please take 32 mg, 12 hours prior to procedure, take another 32 mg, 2 hours prior to procedure for contrast allergy. 2 tablet 0     ondansetron (ZOFRAN) 4 MG tablet Take 1-2 tablets (4-8 mg) by mouth every 6 hours as needed for nausea (vomiting) 40 tablet 0     ondansetron (ZOFRAN) 4 MG tablet Take 1-2 tablets (4-8 mg) by mouth every 6 hours as needed for nausea (vomiting) (Patient not taking: Reported on 11/13/2020) 40 tablet 0     oxyCODONE (ROXICODONE) 5 MG tablet Take 1-2 tablets (5-10 mg) by mouth every 6 hours as needed for moderate to severe pain (Patient not taking: Reported on 11/13/2020) 10 tablet 0     oxyCODONE (ROXICODONE) 5 MG tablet Take 1-2 tablets (5-10 mg) by mouth every 6 hours as needed for moderate to severe pain (Patient not taking: Reported on 11/13/2020) 10 tablet 0     pantoprazole (PROTONIX) 40 MG EC tablet Take 1 tablet (40 mg) by mouth daily (Patient not taking: Reported on 11/13/2020) 30 tablet 0     pantoprazole (PROTONIX) 40 MG EC tablet Take 1 tablet (40 mg) by mouth daily 30 tablet 0     polyethylene glycol (MIRALAX) 17 GM/Dose  "powder Take 17 g (1 capful) by mouth 2 times daily 255 g 1     prochlorperazine (COMPAZINE) 10 MG tablet Take 1 tablet (10 mg) by mouth every 6 hours as needed (Nausea/Vomiting) 30 tablet 3     senna-docusate (SENNA S) 8.6-50 MG tablet Take 1 tablet by mouth 2 times daily as needed for constipation 60 tablet 1     tamsulosin (FLOMAX) 0.4 MG capsule Take 0.4 mg by mouth       Testosterone 1.62 % GEL        REVIEW OF SYSTEMS  12-point ROS negative except as in HPI    Past Medical History:   Diagnosis Date     Degenerative joint disease      Metastatic melanoma (H)     L eye     Nonsenile cataract      Past Surgical History:   Procedure Laterality Date     ------------OTHER-------------  2014    back surgery L4/L5      ------------OTHER-------------      knee surgery both 1961 Right, 1971 Left knee     AS REMOVAL OF KIDNEY STONE  2015     IR SIRT (SELECTIVE INTERNAL RADIO THERAPY)  10/13/2020     IR VISCERAL ANGIOGRAM  10/13/2020     IR VISCERAL EMBOLIZATION  10/19/2020     JOINT REPLACEMENT  2017    Both kneses replaced (2017 and 2018)     ROTATOR CUFF REPAIR RT/LT Right 2016     TURP  2000     Family History   Problem Relation Age of Onset     Glaucoma Mother      Prostate Cancer Father      Cancer Sister      Macular Degeneration No family hx of      Objective:  There were no vitals taken for this visit.  General: alert and no distress  Psych: Alert and oriented times; coherent speech, normal rate and volume, able to articulate logical thoughts, able to abstract reason, no tangential thoughts, no hallucinations or delusions  Patient's affect is appropriate.   Pulm: Speaking in full sentences, unlabored, no audible wheezes or cough.  The rest of a comprehensive physical examination is deferred due to PHE (public health emergency) video restrictions\"    LABS:  No new labs to review this visit.    ASSESSMENT AND PLAN  #Uveal melanoma with metastasis to liver and bone  #Choroidal melanoma with ciliary body involvement, " left eye, status-post plaque brachytherapy,  [employer] Class II and PRAME mRNA positive  Mr. Mayberry is a 76 year old man with uveal melanoma with metastasis to the liver and bone. 8/26/2020, MRI-liver shows interval development of numerous (more than 30) metastatic lesions in the liver in bilobar distribution. On 10/19/2020, he underwent Y90 radioembolization with 72.2 mCi of theraspheres to the right lobe of the liver. 10/22/20 MR abdomen showed significantly increased in size and numbers of the innumerable metastatic liver lesions, as well as multiple metastatic bone lesions in the visualized thoracolumbar spine and pelvic bones.     On 10/23/2020, patient received his first cycle of Ipilimumab 1mg/kg and nivolumab 3mg/kg with palliative intent. Patient tolerated C1 fairly well, except for increased fatigue, nausea and CHUYITA. No current signs of immune mediated toxicities. Favian was called today for a check-in after receiving C2 11/13/20 and Y-90 treatment 11/16 with Dr. Storey. Thus far, he is tolerating well, though is starting to have some fatigue and nausea and is nervous about the potential worsening of these symptoms in the near future.     In summary:  -MRI liver prior to visit with Dr. Trent 12/3, then cycle 3 following visit.   -Cycle 4 will tentatively be on 12/23  -MRI liver pior to visit with Dr. Trent 1/14/2021, then cycle 5 with nivo alone following visit     #Coping  Patient has had a difficult time processing his prognosis and is apprehensive about the side effects of his treatments.  -Provided a referral to Palliative Care team, which patient was very responsive to  -Discussed that our care coordinator Sayra will plan to check in with him 11/20 by phone  -Provided triage line. Reminded patient he can call us with any new concerns and reassured him that we are here for him    #Weight Loss  #Loss of Appetite  Lost 25 lbs since initial diagnosis and complains of loss of appetite week 2 of  his cycles.   -Referral provided for Dietician    #Nausea  Patient complained of nausea a few days after his C1 of nivo/ipi. He states that he felt well ~5 days after C2, but started feeling nauseated again this morning. Reports Zofran more effective than compazine. Denies symptoms of reflux or abdominal pain that could indicate gastritis. No other neuro deficits or concerns that would prompt brain imaging at this time.   -Continue Zofran PO    #Constipation  Patient experienced some constipation 2/2 Zofran and the Ferrous Sulfate he takes. However, has had regular bowel movements this cycle so far due to change in his bowel regimen made last week to Miralax + Senna.  -Bowel regimen of Senna + Miralax twice daily     #Urinary Frequency  Patient experienced urinary frequency last night well above his baseline (7 times last night vs. 2 at baseline). No hematuria or flank pain. No dysuria, fever/chills, back pain.  -Continue to monitor    #Skin Irritation  Very mild and localized. Almost resembles an abrasion. Not actively concerning for immune mediated toxicity.   -Recommended Cerave moisturizer, as well as OTC hydrocortisone cream PRN should it become itchy. Monitor    Jalyn Greenberg PA-C  [facility]  [address] [place] [address]  [phone]    The patient was seen in conjunction with ANDREW Falk who served as a scribe for today's visit. I have reviewed and edited the above note, and agree with the above findings and plan.

## 2022-10-07 NOTE — PLAN OF CARE
10/07/22 1430   Discharge Assessment   Assessment Type Discharge Planning Assessment   Confirmed/corrected address, phone number and insurance Yes   Source of Information patient   Lives With alone   Do you expect to return to your current living situation? Yes   Do you have help at home or someone to help you manage your care at home? No   Current cognitive status: Alert/Oriented   Equipment Currently Used at Home none   Do you currently have service(s) that help you manage your care at home? No   Do you have prescription coverage? Yes   Are you on dialysis? No   Discharge Plan discussed with: Patient   No PCP. Patient lives alone on the second floor of her apartment. No transportation to appointments. Daughter visits every Sunday to shop and clean. Patient wants to discharge home, waiting PT recs.

## 2022-10-08 LAB
ALBUMIN SERPL-MCNC: 2.8 GM/DL (ref 3.4–4.8)
ALBUMIN/GLOB SERPL: 0.8 RATIO (ref 1.1–2)
ALP SERPL-CCNC: 64 UNIT/L (ref 40–150)
ALT SERPL-CCNC: 28 UNIT/L (ref 0–55)
AST SERPL-CCNC: 31 UNIT/L (ref 5–34)
BASOPHILS # BLD AUTO: 0.02 X10(3)/MCL (ref 0–0.2)
BASOPHILS NFR BLD AUTO: 0.5 %
BILIRUBIN DIRECT+TOT PNL SERPL-MCNC: 1.4 MG/DL
BUN SERPL-MCNC: 9.5 MG/DL (ref 9.8–20.1)
CALCIUM SERPL-MCNC: 8.4 MG/DL (ref 8.4–10.2)
CHLORIDE SERPL-SCNC: 101 MMOL/L (ref 98–107)
CO2 SERPL-SCNC: 36 MMOL/L (ref 23–31)
CREAT SERPL-MCNC: 0.56 MG/DL (ref 0.55–1.02)
EOSINOPHIL # BLD AUTO: 0.13 X10(3)/MCL (ref 0–0.9)
EOSINOPHIL NFR BLD AUTO: 3.1 %
ERYTHROCYTE [DISTWIDTH] IN BLOOD BY AUTOMATED COUNT: 13.9 % (ref 11.5–17)
GFR SERPLBLD CREATININE-BSD FMLA CKD-EPI: >60 MLS/MIN/1.73/M2
GLOBULIN SER-MCNC: 3.4 GM/DL (ref 2.4–3.5)
GLUCOSE SERPL-MCNC: 130 MG/DL (ref 82–115)
HCT VFR BLD AUTO: 38 % (ref 37–47)
HGB BLD-MCNC: 12.7 GM/DL (ref 12–16)
IMM GRANULOCYTES # BLD AUTO: 0.01 X10(3)/MCL (ref 0–0.04)
IMM GRANULOCYTES NFR BLD AUTO: 0.2 %
LYMPHOCYTES # BLD AUTO: 0.52 X10(3)/MCL (ref 0.6–4.6)
LYMPHOCYTES NFR BLD AUTO: 12.3 %
MAGNESIUM SERPL-MCNC: 1.8 MG/DL (ref 1.6–2.6)
MCH RBC QN AUTO: 33 PG (ref 27–31)
MCHC RBC AUTO-ENTMCNC: 33.4 MG/DL (ref 33–36)
MCV RBC AUTO: 98.7 FL (ref 80–94)
MONOCYTES # BLD AUTO: 0.38 X10(3)/MCL (ref 0.1–1.3)
MONOCYTES NFR BLD AUTO: 9 %
NEUTROPHILS # BLD AUTO: 3.2 X10(3)/MCL (ref 2.1–9.2)
NEUTROPHILS NFR BLD AUTO: 74.9 %
NRBC BLD AUTO-RTO: 0 %
PLATELET # BLD AUTO: 52 X10(3)/MCL (ref 130–400)
PMV BLD AUTO: 12 FL (ref 7.4–10.4)
POTASSIUM SERPL-SCNC: 4.2 MMOL/L (ref 3.5–5.1)
PROT SERPL-MCNC: 6.2 GM/DL (ref 5.8–7.6)
RBC # BLD AUTO: 3.85 X10(6)/MCL (ref 4.2–5.4)
SODIUM SERPL-SCNC: 140 MMOL/L (ref 136–145)
WBC # SPEC AUTO: 4.2 X10(3)/MCL (ref 4.5–11.5)

## 2022-10-08 PROCEDURE — 25000003 PHARM REV CODE 250: Performed by: NURSE PRACTITIONER

## 2022-10-08 PROCEDURE — 36415 COLL VENOUS BLD VENIPUNCTURE: CPT | Performed by: INTERNAL MEDICINE

## 2022-10-08 PROCEDURE — 80053 COMPREHEN METABOLIC PANEL: CPT | Performed by: INTERNAL MEDICINE

## 2022-10-08 PROCEDURE — 25000003 PHARM REV CODE 250: Performed by: INTERNAL MEDICINE

## 2022-10-08 PROCEDURE — 97162 PT EVAL MOD COMPLEX 30 MIN: CPT

## 2022-10-08 PROCEDURE — 21400001 HC TELEMETRY ROOM

## 2022-10-08 PROCEDURE — 83735 ASSAY OF MAGNESIUM: CPT | Performed by: INTERNAL MEDICINE

## 2022-10-08 PROCEDURE — 85025 COMPLETE CBC W/AUTO DIFF WBC: CPT | Performed by: INTERNAL MEDICINE

## 2022-10-08 RX ORDER — CARVEDILOL 3.12 MG/1
6.25 TABLET ORAL 2 TIMES DAILY
Status: DISCONTINUED | OUTPATIENT
Start: 2022-10-09 | End: 2022-10-10 | Stop reason: HOSPADM

## 2022-10-08 RX ADMIN — Medication 100 MG: at 11:10

## 2022-10-08 RX ADMIN — COLLAGENASE SANTYL: 250 OINTMENT TOPICAL at 11:10

## 2022-10-08 RX ADMIN — COLLAGENASE SANTYL: 250 OINTMENT TOPICAL at 09:10

## 2022-10-08 RX ADMIN — SPIRONOLACTONE 50 MG: 25 TABLET ORAL at 11:10

## 2022-10-08 RX ADMIN — FUROSEMIDE 40 MG: 40 TABLET ORAL at 11:10

## 2022-10-08 RX ADMIN — ACETAMINOPHEN 650 MG: 325 TABLET ORAL at 05:10

## 2022-10-08 RX ADMIN — OXYCODONE HYDROCHLORIDE AND ACETAMINOPHEN 1 TABLET: 5; 325 TABLET ORAL at 04:10

## 2022-10-08 RX ADMIN — FOLIC ACID 1 MG: 1 TABLET ORAL at 11:10

## 2022-10-08 RX ADMIN — THERA TABS 1 TABLET: TAB at 11:10

## 2022-10-08 RX ADMIN — CARVEDILOL 3.12 MG: 3.12 TABLET, FILM COATED ORAL at 09:10

## 2022-10-08 RX ADMIN — CARVEDILOL 3.12 MG: 3.12 TABLET, FILM COATED ORAL at 11:10

## 2022-10-08 NOTE — PLAN OF CARE
Problem: Physical Therapy  Goal: Physical Therapy Goal  Description: Goals to be met by: 22    Patient will increase functional independence with mobility by performin. Supine to sit with [place]  2. Sit to supine with [place]  3. Sit to stand transfer with Modified [place]  4. Gait  x 150 feet with Modified [place] using Rolling Walker.   5. Ascend/descend 12 stair with bilateral Handrails Stand-by Assistance using No Assistive Device.     Outcome: Ongoing, Progressing

## 2022-10-08 NOTE — PT/OT/SLP EVAL
Physical Therapy Evaluation    Patient Name:  Katharina Larson   MRN:  [id number]    Recommendations:     Discharge Recommendations:  home health PT, home with home health   Discharge Equipment Recommendations: walker, rolling   Barriers to discharge:  medical dx; fall risk; decreased functional indep    Assessment:     Katharina Larson is a 61 y.o. female admitted with a medical diagnosis of B wounds to knees d/t recurrent falls.  She presents with the following impairments/functional limitations:  weakness, gait instability, impaired endurance, impaired balance, decreased lower extremity function, impaired functional mobility.    Rehab Prognosis: Good; patient would benefit from acute skilled PT services to address these deficits and reach maximum level of function.    Recent Surgery: * No surgery found *      Plan:     During this hospitalization, patient to be seen 6 x/week to address the identified rehab impairments via gait training, therapeutic activities, therapeutic exercises and progress toward the following goals:    Plan of Care Expires:  11/08/22    Subjective     Chief Complaint: n/a  Patient/Family Comments/goals: to go home  Pain/Comfort:  Pain Rating 1: 0/10    Patients cultural, spiritual, Jain conflicts given the current situation: no    Living Environment:  Pt reports living in an apartment on the second floor; has about 12 stairs to climb with bilateral railings.   Prior to admission, patients level of function was independent. Except had 2 falls recently and was unable to get herself back up. Daughter lives in [place] with a young child so can only help occasionally; limited family/friend assistance otherwise.    Equipment used at home: none.  DME owned (not currently used): none. Will need RW prior to d/c.  Upon discharge, patient will have assistance from unsure.    Objective:     Communicated with NSG prior to session.      Upon Pt entry to the room pt was found walking back to bed from  "toilet in room. Pt was holding on to chair/door handle for support. Pt reported that she called and had been waiting "2 hours" and no one has come so she got herself up, walked to toilet, and was able to use it. PT then provided pt with RW for further gait evaluation.     General Precautions: Standard, fall   Orthopedic Precautions:N/A   Braces: N/A  Respiratory Status: Room air    Exams:  Cognitive Exam:  Patient is oriented to Person, Place, Time, and Situation  RLE Strength: WFL  LLE Strength: WFL    Functional Mobility:  Transfers:     Sit to Stand:  minimum assistance with rolling walker  Gait: pt ambulated around room for about 20 feet with use of RW and CGA for safety    AM-PAC 6 CLICK MOBILITY  Total Score:18     Patient left up in chair with all lines intact and call button in reach.    GOALS:   Multidisciplinary Problems       Physical Therapy Goals          Problem: Physical Therapy    Goal Priority Disciplines Outcome Goal Variances Interventions   Physical Therapy Goal     PT, PT/OT Ongoing, Progressing     Description: Goals to be met by: 22    Patient will increase functional independence with mobility by performin. Supine to sit with [place]  2. Sit to supine with [place]  3. Sit to stand transfer with Modified [place]  4. Gait  x 150 feet with Modified [place] using Rolling Walker.   5. Ascend/descend 12 stair with bilateral Handrails Stand-by Assistance using No Assistive Device.                          History:     Past Medical History:   Diagnosis Date    Unspecified viral hepatitis C without hepatic coma        History reviewed. No pertinent surgical history.    Time Tracking:     PT Received On: 10/08/22  PT Start Time: 1009     PT Stop Time: 1019  PT Total Time (min): 10 min     Billable Minutes: Evaluation mod      10/08/2022  "

## 2022-10-08 NOTE — PROGRESS NOTES
[facility] Hospital Medicine Progress Note        Chief Complaint: Inpatient Follow-up for Fall (Trip/fall today. Abrasions to bilateral knees & elbows. States she did hit her head, no head trauma, -LOC, -BT. Denies LOC. EMS was called to pts house yesterday for a fall and was on the floor 2 days, refused ambulance transfer at that time. Fall again today at approx 0400. )    HPI: Ms. Larson is an obese 61-year-old female with history of hepatitis-C, treated in 2015, tobacco and alcohol use presents to the ED via EMS with c/o bilateral knee pain after sustaining 2 ground level falls in the past 48 hours.  Patient states that she tripped and fell and was too weak to get up yesterday and called EMS and they were able to help her get back up and she declined coming to the ED for evaluation, EMS reported that she was laying in her urine and feces.  However early this morning around 0400 she went to the bathroom and got tripped up by her cat and she fell again, but  did not have her phone by her and she was too weak to move so she was on the ground for nearly 12 hours.  When EMS arrived she was hypoxic with an SpO2 of 93% and placed on 2 L nasal cannula.  Patient denies chest pain, palpitations, unilateral weakness, vision changes prior to her falls, she is adamant that she just tripped and fell and was too weak to get up.     Patient does not have a PCP and she is not currently on any medications.  She states she had hypertension at 1 time but that was several years ago.  She endorses dyspnea on exertion, orthopnea having to use 3 and 4 pillows to sleep at night, generalized edema and abdominal swelling that is increasing in size over the past several months.  Patient states she drinks 3-4 glasses of wine 2-3 days a week. She smokes 1 ppd for several years.      initial ED VS:  Temperature 97.9°, heart rate 108, respirations 24, blood pressure 146/80, oxygenation 96% on 2 L NC.  CT head  negative for acute intracranial findings.  X-ray of the left knee shows degenerative changes with a small joint effusion, chest x-ray showed pulmonary vascular congestion.  Laboratory work remarkable for    Platelets 60, PT 16.2, INR 1.32, magnesium 1.5, bilirubin 2.9, AST 87,    CRP 24, sed rate 29, lactic acid 2.3 with repeat lactic acid 4 hours later resolved, serum alcohol level 32, BNP 68. Patient was given 1 L of normal saline in the ED.  She was also started on vancomycin for possible sepsis from bilateral lower extremity cellulitis.       Patient seen and examined this evening with myself and Dr. Felix.  She endorses a history of hep C that was treated twice in 2015 and then again in 2016 with a clinical trial agent???. She is unsure how she got hepatitis-C, as she denies IV drug use or hx blood transfusion. Her lower extremities do not appeared to have cellulitis however she does have wounds with scabs to bilateral knees from being on the ground, they do not appear to be infected.    Interval Hx:     Refused PT. Seen by wound care nurse with recs for debridement of bilateral knee eschars from previous falls. Complains of abdominal distention. Very agitated . Pt is from [facility]  and going through a lot since she still has family in [facility]. Explained that if she refuses PT  I will not be able to provide help requiered. Asking for something to help her sleep      Objective/physical exam:  General: Appears uncomfortable, supplemental o2 in nares, anasarca    HEENT: NC/AT    Neck:  No JVD    Chest: crackles to bilateral bases    CVS: Regular rhythm. Normal S1/S2.    Abdomen: distended, normoactive BS, soft and non-tender, moderate ascites, not tense, + fluid wave.     MSK: No obvious deformity or joint swelling    Skin: pressure wounds with scabs to bilateral knees and right elbow with serous drainage, good pedal pulses bilaterally  -s/p right knee debridement    Neuro: AAOx3, no focal neurological  deficit    Psych: Cooperative    VITAL SIGNS: 24 HRS MIN & MAX LAST   Temp  Min: 98 °F (36.7 °C)  Max: 99 °F (37.2 °C) 98.9 °F (37.2 °C)   BP  Min: 108/77  Max: 162/94 (!) 162/94     Pulse  Min: 79  Max: 95  95   Resp  Min: 16  Max: 22 (!) 22     SpO2  Min: 91 %  Max: 98 % 97 %       Recent Labs   Lab 10/05/22  1802 10/06/22  0500 10/07/22  0555   WBC 7.6 6.6 5.1   RBC 4.22 4.21 3.87*   HGB 13.7 13.5 12.6   HCT 40.7 41.4 38.8   MCV 96.4* 98.3* 100.3*   MCH 32.5* 32.1* 32.6*   MCHC 33.7 32.6* 32.5*   RDW 14.4 14.4 14.0   PLT 60* 50* 49*   MPV 11.9* 12.8* 12.2*       Recent Labs   Lab 10/05/22  1802 10/06/22  0500 10/07/22  0555    138 139   K 3.6 3.4* 4.4   CO2 26 30 36*   BUN 8.5* 7.7* 9.5*   CREATININE 0.51* 0.56 0.54*   CALCIUM 8.2* 8.0* 8.3*   MG 1.50* 1.70 1.90   ALBUMIN 3.0* 3.0* 2.7*   ALKPHOS 75 76 69   ALT 40 37 29   AST 87* 70* 38*   BILITOT 2.9* 3.3* 1.5          Microbiology Results (last 7 days)       Procedure Component Value Units Date/Time    Blood Culture #1 **CANNOT BE ORDERED STAT** [[id number]]  (Normal) Collected: 10/05/22 1802    Order Status: Completed Specimen: Blood Updated: 10/07/22 2001     CULTURE, BLOOD ([facility]) No Growth At 48 Hours    Blood Culture #2 **CANNOT BE ORDERED STAT** [[id number]]  (Normal) Collected: 10/05/22 1802    Order Status: Completed Specimen: Blood Updated: 10/07/22 2001     CULTURE, BLOOD ([facility]) No Growth At 48 Hours             See below for Radiology    Scheduled Med:   amLODIPine  5 mg Oral Daily    collagenase   Topical (Top) BID    folic acid  1 mg Oral Daily    morphine  2 mg Intravenous Once    multivitamin  1 tablet Oral Daily    Banana bag   Intravenous Once        Continuous Infusions:       PRN Meds:  acetaminophen, aluminum-magnesium hydroxide-simethicone, bisacodyL, chlordiazepoxide, labetalol, ondansetron, oxyCODONE-acetaminophen, polyethylene glycol, prochlorperazine, senna-docusate 8.6-50 mg, sodium chloride 0.9%, trazodone        Assessment/Plan:    Acute hypoxic respiratory failure, multifactorial      DD grade 2      Vhd  -mild to moderate aortic stenosis      Cirrhosis w portal hypertension 2ry to etoh abuse  Anasarca/Ascites, Coagulopathy/thrombocytopenia,   -Lasix 40 mgs po daily  -aldactone 50 mgs po bid  -coreg 3.125 mgs po bid      Alcohol Use/Abuse  -alcohol cessation counseling given      Hyperbilirubinemia and Elevated AST      Hypomagnesemia  replaced      Atraumatic Rhabdomysis   -resolving      Ground Level Falls with BLE weakness  - continue PT      Superficial Wound to Bilateral Knees/Right Elbow      Essential HTN  - coreg 3.125 mgs po bid      Morbid Obesity      Plan- as above. I encouraged pt to participate with PT so that way we can attempt to provide her with home health w PT. It appears that she lives in a second floor apt.      VTE prophylaxis: coagulopathy    Patient condition:  Guarded/    Anticipated discharge and Disposition:         All diagnosis and differential diagnosis have been reviewed; assessment and plan has been documented; I have personally reviewed the labs and test results that are presently available; I have reviewed the patients medication list; I have reviewed the consulting providers response and recommendations. I have reviewed or attempted to review medical records based upon their availability    All of the patient's questions have been  addressed and answered. Patient's is agreeable to the above stated plan. I will continue to monitor closely and make adjustments to medical management as needed.  _____________________________________________________________________    Nutrition Status:    Radiology:  Echo  · The left ventricle is normal in size with concentric hypertrophy and   normal systolic function.  · The estimated ejection fraction is 65%.  · Grade II left ventricular diastolic dysfunction.  · Mild mitral regurgitation.  · There is mild-to-moderate aortic valve stenosis.  · Aortic  valve area is 1.58 cm2; peak velocity is 3.05 m/s; mean gradient   is 23 mmHg.  · Normal central venous pressure (3 mmHg).  · The estimated PA systolic pressure is 23 mmHg.  · Mild left atrial enlargement.     US Abdomen Limited  Narrative: EXAMINATION:  US ABDOMEN LIMITED    CLINICAL HISTORY:  cirrhosis; ascites;    COMPARISON:  No previous studies are available for comparison.    FINDINGS:  Grayscale, color and spectral doppler evaluation of the right upper quadrant.    No focal abnormality of limited visualized pancreas. Imaged portions of aorta and IVC normal in caliber.    The liver is not significantly enlarged.  There is coarsened patent echotexture.  There is nodular cirrhotic hepatic contour.  Normal hepatopetal flow is noted in the portal vein.    No gallstones. No significant gallbladder wall thickening or pericholecystic fluid.  The common bile duct is normal in caliber  and measures 4 mm.    Right kidney measures 12 cm in length. No hydronephrosis.    No significant ascites today.  Impression: 1. Cirrhosis.  2. Patent portal vein.  3. No significant ascites today.    Electronically signed by: Medardo Luke  Date:    10/06/2022  Time:    08:27      Carlito Cast MD   10/07/2022

## 2022-10-08 NOTE — PROCEDURES
"Katharina Larson is a 61 y.o. female patient.    Temp: 98.9 °F (37.2 °C) (10/07/22 2118)  Pulse: 95 (10/07/22 2343)  Resp: (!) 22 (10/07/22 2118)  BP: (!) 162/94 (10/07/22 2343)  SpO2: 97 % (10/07/22 2118)  Weight: 113.4 kg (250 lb) (10/06/22 0450)  Height: 5' 7" (170.2 cm) (10/06/22 0450)       Incision and Drainage    Date/Time: 10/8/2022 1:58 AM  [facility]  Performed by: Gerson Medina MD  Authorized by: Man Spencer Jr., MD   Consent Done: Yes  Consent: Written consent obtained.  Indications for incision and drainage: Right knee eschar, concern for underlying infection.  Anesthesia: local infiltration  Scalpel size: 11  Wound treatment: incision (unroofing of wound with removal of eschar. Patient was unable to tolerate complete removal of eschar so procedure was terminated)  DO NOT USE COMPLICATIONS: bleeding controlled with surgicel and figure of eight suture x2.        10/8/2022    Plan:  - will consult wound care  - daily dressing changes with wet-dry dressings, nehemias Medina MD  [employer] General Surgery, PGY-2    "

## 2022-10-09 PROCEDURE — 25000003 PHARM REV CODE 250: Performed by: NURSE PRACTITIONER

## 2022-10-09 PROCEDURE — 25000003 PHARM REV CODE 250: Performed by: INTERNAL MEDICINE

## 2022-10-09 PROCEDURE — 63600175 PHARM REV CODE 636 W HCPCS: Performed by: NURSE PRACTITIONER

## 2022-10-09 PROCEDURE — 27000221 HC OXYGEN, UP TO 24 HOURS

## 2022-10-09 PROCEDURE — 21400001 HC TELEMETRY ROOM

## 2022-10-09 RX ORDER — SPIRONOLACTONE 25 MG/1
50 TABLET ORAL 2 TIMES DAILY
Status: DISCONTINUED | OUTPATIENT
Start: 2022-10-09 | End: 2022-10-10 | Stop reason: HOSPADM

## 2022-10-09 RX ADMIN — Medication 100 MG: at 09:10

## 2022-10-09 RX ADMIN — CARVEDILOL 6.25 MG: 3.12 TABLET, FILM COATED ORAL at 09:10

## 2022-10-09 RX ADMIN — TRAZODONE HYDROCHLORIDE 25 MG: 50 TABLET ORAL at 12:10

## 2022-10-09 RX ADMIN — COLLAGENASE SANTYL: 250 OINTMENT TOPICAL at 09:10

## 2022-10-09 RX ADMIN — FUROSEMIDE 40 MG: 40 TABLET ORAL at 09:10

## 2022-10-09 RX ADMIN — FOLIC ACID 1 MG: 1 TABLET ORAL at 09:10

## 2022-10-09 RX ADMIN — ONDANSETRON 4 MG: 2 INJECTION INTRAMUSCULAR; INTRAVENOUS at 12:10

## 2022-10-09 RX ADMIN — TRAZODONE HYDROCHLORIDE 25 MG: 50 TABLET ORAL at 08:10

## 2022-10-09 RX ADMIN — SPIRONOLACTONE 50 MG: 25 TABLET ORAL at 08:10

## 2022-10-09 RX ADMIN — OXYCODONE HYDROCHLORIDE AND ACETAMINOPHEN 1 TABLET: 5; 325 TABLET ORAL at 09:10

## 2022-10-09 RX ADMIN — THERA TABS 1 TABLET: TAB at 09:10

## 2022-10-09 RX ADMIN — COLLAGENASE SANTYL: 250 OINTMENT TOPICAL at 08:10

## 2022-10-09 RX ADMIN — OXYCODONE HYDROCHLORIDE AND ACETAMINOPHEN 1 TABLET: 5; 325 TABLET ORAL at 05:10

## 2022-10-09 RX ADMIN — SPIRONOLACTONE 50 MG: 25 TABLET ORAL at 09:10

## 2022-10-09 RX ADMIN — CARVEDILOL 6.25 MG: 3.12 TABLET, FILM COATED ORAL at 08:10

## 2022-10-09 NOTE — PROGRESS NOTES
[facility] Hospital Medicine Progress Note        Chief Complaint: Inpatient Follow-up for Fall (Trip/fall today. Abrasions to bilateral knees & elbows. States she did hit her head, no head trauma, -LOC, -BT. Denies LOC. EMS was called to pts house yesterday for a fall and was on the floor 2 days, refused ambulance transfer at that time. Fall again today at approx 0400. )    HPI: Ms. Larson is an obese 61-year-old female with history of hepatitis-C, treated in 2015, tobacco and alcohol use presents to the ED via EMS with c/o bilateral knee pain after sustaining 2 ground level falls in the past 48 hours.  Patient states that she tripped and fell and was too weak to get up yesterday and called EMS and they were able to help her get back up and she declined coming to the ED for evaluation, EMS reported that she was laying in her urine and feces.  However early this morning around 0400 she went to the bathroom and got tripped up by her cat and she fell again, but  did not have her phone by her and she was too weak to move so she was on the ground for nearly 12 hours.  When EMS arrived she was hypoxic with an SpO2 of 93% and placed on 2 L nasal cannula.  Patient denies chest pain, palpitations, unilateral weakness, vision changes prior to her falls, she is adamant that she just tripped and fell and was too weak to get up.     Patient does not have a PCP and she is not currently on any medications.  She states she had hypertension at 1 time but that was several years ago.  She endorses dyspnea on exertion, orthopnea having to use 3 and 4 pillows to sleep at night, generalized edema and abdominal swelling that is increasing in size over the past several months.  Patient states she drinks 3-4 glasses of wine 2-3 days a week. She smokes 1 ppd for several years.      initial ED VS:  Temperature 97.9°, heart rate 108, respirations 24, blood pressure 146/80, oxygenation 96% on 2 L NC.  CT head  negative for acute intracranial findings.  X-ray of the left knee shows degenerative changes with a small joint effusion, chest x-ray showed pulmonary vascular congestion.  Laboratory work remarkable for    Platelets 60, PT 16.2, INR 1.32, magnesium 1.5, bilirubin 2.9, AST 87,    CRP 24, sed rate 29, lactic acid 2.3 with repeat lactic acid 4 hours later resolved, serum alcohol level 32, BNP 68. Patient was given 1 L of normal saline in the ED.  She was also started on vancomycin for possible sepsis from bilateral lower extremity cellulitis.       Patient seen and examined this evening with myself and Dr. Felix.  She endorses a history of hep C that was treated twice in 2015 and then again in 2016 with a clinical trial agent???. She is unsure how she got hepatitis-C, as she denies IV drug use or hx blood transfusion. Her lower extremities do not appeared to have cellulitis however she does have wounds with scabs to bilateral knees from being on the ground, they do not appear to be infected.    Interval Hx:     10/7/22-Refused PT. Seen by wound care nurse with recs for debridement of bilateral knee eschars from previous falls. Complains of abdominal distention. Very agitated . Pt is from [facility]  and going through a lot since she still has family in [facility]. Explained that if she refuses PT  I will not be able to provide help requiered. Asking for something to help her sleep    10-8-22- pt tolerated PT today w RW and minimal assistance. Recs for home w home health. Lives on a second floor apt/ daughter lives in [facility]    10/9/22 doing better/ took shower/less emotional      Objective/physical exam:  General: Appears uncomfortable, supplemental o2 in nares, anasarca    HEENT: NC/AT    Neck:  No JVD    Chest: crackles to bilateral bases    CVS: Regular rhythm. Normal S1/S2.    Abdomen: distended, normoactive BS, soft and non-tender, some abdominal distention  not tense  - no significant ascites by abd US    MSK: No  obvious deformity or joint swelling    Skin: pressure wounds with scabs to bilateral knees and right elbow with serous drainage, good pedal pulses bilaterally  -s/p partial right knee debridement    Neuro: AAOx3, no focal neurological deficit    Psych: Cooperative    VITAL SIGNS: 24 HRS MIN & MAX LAST   Temp  Min: 97.9 °F (36.6 °C)  Max: 98.6 °F (37 °C) 98 °F (36.7 °C)   BP  Min: 115/72  Max: 158/92 115/72     Pulse  Min: 72  Max: 100  72   Resp  Min: 16  Max: 22 18     SpO2  Min: 91 %  Max: 100 % 96 %       Recent Labs   Lab 10/06/22  0500 10/07/22  0555 10/08/22  0503   WBC 6.6 5.1 4.2*   RBC 4.21 3.87* 3.85*   HGB 13.5 12.6 12.7   HCT 41.4 38.8 38.0   MCV 98.3* 100.3* 98.7*   MCH 32.1* 32.6* 33.0*   MCHC 32.6* 32.5* 33.4   RDW 14.4 14.0 13.9   PLT 50* 49* 52*   MPV 12.8* 12.2* 12.0*         Recent Labs   Lab 10/06/22  0500 10/07/22  0555 10/08/22  0503    139 140   K 3.4* 4.4 4.2   CO2 30 36* 36*   BUN 7.7* 9.5* 9.5*   CREATININE 0.56 0.54* 0.56   CALCIUM 8.0* 8.3* 8.4   MG 1.70 1.90 1.80   ALBUMIN 3.0* 2.7* 2.8*   ALKPHOS 76 69 64   ALT 37 29 28   AST 70* 38* 31   BILITOT 3.3* 1.5 1.4            Microbiology Results (last 7 days)       Procedure Component Value Units Date/Time    Blood Culture #2 **CANNOT BE ORDERED STAT** [[id number]]  (Normal) Collected: 10/05/22 1802    Order Status: Completed Specimen: Blood Updated: 10/08/22 2000     CULTURE, BLOOD ([facility]) No Growth At 72 Hours    Blood Culture #1 **CANNOT BE ORDERED STAT** [[id number]]  (Normal) Collected: 10/05/22 1802    Order Status: Completed Specimen: Blood Updated: 10/08/22 2000     CULTURE, BLOOD ([facility]) No Growth At 72 Hours             See below for Radiology    Scheduled Med:   carvediloL  6.25 mg Oral BID    collagenase   Topical (Top) BID    folic acid  1 mg Oral Daily    furosemide  40 mg Oral Daily    morphine  2 mg Intravenous Once    multivitamin  1 tablet Oral Daily    spironolactone  50 mg Oral BID    thiamine  100 mg Oral Daily         Continuous Infusions:       PRN Meds:  acetaminophen, aluminum-magnesium hydroxide-simethicone, bisacodyL, chlordiazepoxide, labetalol, ondansetron, oxyCODONE-acetaminophen, polyethylene glycol, prochlorperazine, senna-docusate 8.6-50 mg, sodium chloride 0.9%, trazodone       Assessment/Plan:    Acute hypoxic respiratory failure, multifactorial      DD grade 2  -coreg      Vhd  -mild to moderate aortic stenosis      Cirrhosis w portal hypertension 2ry to etoh abuse/hep c(treated x 2)  Anasarca/Ascites, Coagulopathy/thrombocytopenia,   -Lasix 40 mgs po daily  -aldactone 50 mgs po bid  -coreg 6.2 mgs po bid      Alcohol Use/Abuse  -alcohol cessation counseling givenand reinforced on a daily basis      Hyperbilirubinemia and Elevated AST      Hypomagnesemia  replaced      Atraumatic Rhabdomysis   -resolving      Ground Level Falls with BLE weakness  - continue PT  - home health w PT/wound care       Superficial Wound to Bilateral Knees/Right Elbow  -post partial debridement of right knee eschar      Essential HTN  - titrate coreg       Morbid Obesity      Plan- as above.  for home health w PT/wound care/transportation home/[facility] appt with GI/felicia cota. she lives in a second floor apt.minimal help from daughter that lives in [facility]. Home tomorrow w help from       VTE prophylaxis: coagulopathy    Patient condition:  Guarded/    Anticipated discharge and Disposition:         All diagnosis and differential diagnosis have been reviewed; assessment and plan has been documented; I have personally reviewed the labs and test results that are presently available; I have reviewed the patients medication list; I have reviewed the consulting providers response and recommendations. I have reviewed or attempted to review medical records based upon their availability    All of the patient's questions have been  addressed and answered. Patient's is agreeable to the above stated plan. I will continue to monitor  closely and make adjustments to medical management as needed.  _____________________________________________________________________    Nutrition Status:    Radiology:  X-Ray Chest 1 View  Narrative: EXAMINATION:  XR CHEST 1 VIEW    CLINICAL HISTORY:  aspitaion pna/ nstemi;    TECHNIQUE:  Single frontal view of the chest was performed.    COMPARISON:  10/05/2022    FINDINGS:  LINES AND TUBES: EKG/telemetry leads overlie the chest.    MEDIASTINUM AND MYRON: Cardiac silhouette is at the upper limits of normal. Aortic atherosclerosis.    LUNGS: Mild vascular congestion.  Improved aeration compared to prior.  No lobar consolidation.    PLEURA:No pleural effusion. No pneumothorax.    BONES: No acute osseous abnormality.  Impression: Mild vascular congestion with improved aeration compared to prior PET    Electronically signed by: Yadira Ken  Date:    10/09/2022  Time:    09:15      Carlito Cast MD   10/09/2022

## 2022-10-09 NOTE — PROGRESS NOTES
[facility] Hospital Medicine Progress Note        Chief Complaint: Inpatient Follow-up for Fall (Trip/fall today. Abrasions to bilateral knees & elbows. States she did hit her head, no head trauma, -LOC, -BT. Denies LOC. EMS was called to pts house yesterday for a fall and was on the floor 2 days, refused ambulance transfer at that time. Fall again today at approx 0400. )    HPI: Ms. Larson is an obese 61-year-old female with history of hepatitis-C, treated in 2015, tobacco and alcohol use presents to the ED via EMS with c/o bilateral knee pain after sustaining 2 ground level falls in the past 48 hours.  Patient states that she tripped and fell and was too weak to get up yesterday and called EMS and they were able to help her get back up and she declined coming to the ED for evaluation, EMS reported that she was laying in her urine and feces.  However early this morning around 0400 she went to the bathroom and got tripped up by her cat and she fell again, but  did not have her phone by her and she was too weak to move so she was on the ground for nearly 12 hours.  When EMS arrived she was hypoxic with an SpO2 of 93% and placed on 2 L nasal cannula.  Patient denies chest pain, palpitations, unilateral weakness, vision changes prior to her falls, she is adamant that she just tripped and fell and was too weak to get up.     Patient does not have a PCP and she is not currently on any medications.  She states she had hypertension at 1 time but that was several years ago.  She endorses dyspnea on exertion, orthopnea having to use 3 and 4 pillows to sleep at night, generalized edema and abdominal swelling that is increasing in size over the past several months.  Patient states she drinks 3-4 glasses of wine 2-3 days a week. She smokes 1 ppd for several years.      initial ED VS:  Temperature 97.9°, heart rate 108, respirations 24, blood pressure 146/80, oxygenation 96% on 2 L NC.  CT head  negative for acute intracranial findings.  X-ray of the left knee shows degenerative changes with a small joint effusion, chest x-ray showed pulmonary vascular congestion.  Laboratory work remarkable for    Platelets 60, PT 16.2, INR 1.32, magnesium 1.5, bilirubin 2.9, AST 87,    CRP 24, sed rate 29, lactic acid 2.3 with repeat lactic acid 4 hours later resolved, serum alcohol level 32, BNP 68. Patient was given 1 L of normal saline in the ED.  She was also started on vancomycin for possible sepsis from bilateral lower extremity cellulitis.       Patient seen and examined this evening with myself and Dr. Felix.  She endorses a history of hep C that was treated twice in 2015 and then again in 2016 with a clinical trial agent???. She is unsure how she got hepatitis-C, as she denies IV drug use or hx blood transfusion. Her lower extremities do not appeared to have cellulitis however she does have wounds with scabs to bilateral knees from being on the ground, they do not appear to be infected.    Interval Hx:     10/7/22-Refused PT. Seen by wound care nurse with recs for debridement of bilateral knee eschars from previous falls. Complains of abdominal distention. Very agitated . Pt is from [facility]  and going through a lot since she still has family in [place] [facility] [facility]. Explained that if she refuses PT  I will not be able to provide help requiered. Asking for something to help her sleep    10-8-22- pt tolerated PT today w RW and minimal assistance. Recs for home w home health. Lives on a second floor apt/ daughter lives in [facility]      Objective/physical exam:  General: Appears uncomfortable, supplemental o2 in nares, anasarca    HEENT: NC/AT    Neck:  No JVD    Chest: crackles to bilateral bases    CVS: Regular rhythm. Normal S1/S2.    Abdomen: distended, normoactive BS, soft and non-tender, some abdominal distention  not tense  - no significant ascites by abd US    MSK: No obvious deformity or joint swelling    Skin: pressure  wounds with scabs to bilateral knees and right elbow with serous drainage, good pedal pulses bilaterally  -s/p partial right knee debridement    Neuro: AAOx3, no focal neurological deficit    Psych: Cooperative    VITAL SIGNS: 24 HRS MIN & MAX LAST   Temp  Min: 98 °F (36.7 °C)  Max: 98.6 °F (37 °C) 98.6 °F (37 °C)   BP  Min: 152/95  Max: 170/96 (!) 158/92     Pulse  Min: 80  Max: 95  80   Resp  Min: 18  Max: 22 (!) 22     SpO2  Min: 95 %  Max: 100 % 98 %       Recent Labs   Lab 10/06/22  0500 10/07/22  0555 10/08/22  0503   WBC 6.6 5.1 4.2*   RBC 4.21 3.87* 3.85*   HGB 13.5 12.6 12.7   HCT 41.4 38.8 38.0   MCV 98.3* 100.3* 98.7*   MCH 32.1* 32.6* 33.0*   MCHC 32.6* 32.5* 33.4   RDW 14.4 14.0 13.9   PLT 50* 49* 52*   MPV 12.8* 12.2* 12.0*         Recent Labs   Lab 10/06/22  0500 10/07/22  0555 10/08/22  0503    139 140   K 3.4* 4.4 4.2   CO2 30 36* 36*   BUN 7.7* 9.5* 9.5*   CREATININE 0.56 0.54* 0.56   CALCIUM 8.0* 8.3* 8.4   MG 1.70 1.90 1.80   ALBUMIN 3.0* 2.7* 2.8*   ALKPHOS 76 69 64   ALT 37 29 28   AST 70* 38* 31   BILITOT 3.3* 1.5 1.4            Microbiology Results (last 7 days)       Procedure Component Value Units Date/Time    Blood Culture #2 **CANNOT BE ORDERED STAT** [[id number]]  (Normal) Collected: 10/05/22 1802    Order Status: Completed Specimen: Blood Updated: 10/08/22 2000     CULTURE, BLOOD ([facility]) No Growth At 72 Hours    Blood Culture #1 **CANNOT BE ORDERED STAT** [[id number]]  (Normal) Collected: 10/05/22 1802    Order Status: Completed Specimen: Blood Updated: 10/08/22 2000     CULTURE, BLOOD ([facility]) No Growth At 72 Hours             See below for Radiology    Scheduled Med:   carvediloL  3.125 mg Oral BID    collagenase   Topical (Top) BID    folic acid  1 mg Oral Daily    furosemide  40 mg Oral Daily    morphine  2 mg Intravenous Once    multivitamin  1 tablet Oral Daily    spironolactone  50 mg Oral Daily    thiamine  100 mg Oral Daily        Continuous Infusions:       PRN  Meds:  acetaminophen, aluminum-magnesium hydroxide-simethicone, bisacodyL, chlordiazepoxide, labetalol, ondansetron, oxyCODONE-acetaminophen, polyethylene glycol, prochlorperazine, senna-docusate 8.6-50 mg, sodium chloride 0.9%, trazodone       Assessment/Plan:    Acute hypoxic respiratory failure, multifactorial      DD grade 2  -coreg      Vhd  -mild to moderate aortic stenosis      Cirrhosis w portal hypertension 2ry to etoh abuse/hep c(treated x 2)  Anasarca/Ascites, Coagulopathy/thrombocytopenia,   -Lasix 40 mgs po daily  -aldactone 50 mgs po bid  -coreg 6.2 mgs po bid      Alcohol Use/Abuse  -alcohol cessation counseling givenand reinforced on a daily basis      Hyperbilirubinemia and Elevated AST      Hypomagnesemia  replaced      Atraumatic Rhabdomysis   -resolving      Ground Level Falls with BLE weakness  - continue PT  - home health w PT/wound care       Superficial Wound to Bilateral Knees/Right Elbow  -post partial debridement of right knee eschar      Essential HTN  - titrate coreg       Morbid Obesity      Plan- as above.  for home health w PT/wound care/transportation home/[facility] appt with GI/felicia cota. she lives in a second floor apt.minimal help from daughter that lives in [facility]      VTE prophylaxis: coagulopathy    Patient condition:  Guarded/    Anticipated discharge and Disposition:         All diagnosis and differential diagnosis have been reviewed; assessment and plan has been documented; I have personally reviewed the labs and test results that are presently available; I have reviewed the patients medication list; I have reviewed the consulting providers response and recommendations. I have reviewed or attempted to review medical records based upon their availability    All of the patient's questions have been  addressed and answered. Patient's is agreeable to the above stated plan. I will continue to monitor closely and make adjustments to medical management as  needed.  _____________________________________________________________________    Nutrition Status:    Radiology:  Echo  · The left ventricle is normal in size with concentric hypertrophy and   normal systolic function.  · The estimated ejection fraction is 65%.  · Grade II left ventricular diastolic dysfunction.  · Mild mitral regurgitation.  · There is mild-to-moderate aortic valve stenosis.  · Aortic valve area is 1.58 cm2; peak velocity is 3.05 m/s; mean gradient   is 23 mmHg.  · Normal central venous pressure (3 mmHg).  · The estimated PA systolic pressure is 23 mmHg.  · Mild left atrial enlargement.     US Abdomen Limited  Narrative: EXAMINATION:  US ABDOMEN LIMITED    CLINICAL HISTORY:  cirrhosis; ascites;    COMPARISON:  No previous studies are available for comparison.    FINDINGS:  Grayscale, color and spectral doppler evaluation of the right upper quadrant.    No focal abnormality of limited visualized pancreas. Imaged portions of aorta and IVC normal in caliber.    The liver is not significantly enlarged.  There is coarsened patent echotexture.  There is nodular cirrhotic hepatic contour.  Normal hepatopetal flow is noted in the portal vein.    No gallstones. No significant gallbladder wall thickening or pericholecystic fluid.  The common bile duct is normal in caliber  and measures 4 mm.    Right kidney measures 12 cm in length. No hydronephrosis.    No significant ascites today.  Impression: 1. Cirrhosis.  2. Patent portal vein.  3. No significant ascites today.    Electronically signed by: Medardo Luke  Date:    10/06/2022  Time:    08:27      Carlito Cast MD   10/08/2022

## 2022-10-10 VITALS
BODY MASS INDEX: 45.99 KG/M2 | SYSTOLIC BLOOD PRESSURE: 106 MMHG | DIASTOLIC BLOOD PRESSURE: 63 MMHG | TEMPERATURE: 98 F | HEIGHT: 67 IN | HEART RATE: 71 BPM | OXYGEN SATURATION: 96 % | RESPIRATION RATE: 18 BRPM | WEIGHT: 293 LBS

## 2022-10-10 PROBLEM — R29.6 FALLS FREQUENTLY: Status: ACTIVE | Noted: 2022-10-10

## 2022-10-10 PROBLEM — Z72.0 TOBACCO ABUSE: Chronic | Status: ACTIVE | Noted: 2022-10-10

## 2022-10-10 PROBLEM — K70.30 ALCOHOLIC CIRRHOSIS: Chronic | Status: ACTIVE | Noted: 2022-10-10

## 2022-10-10 PROBLEM — R29.898 MUSCULAR DECONDITIONING: Chronic | Status: ACTIVE | Noted: 2022-10-10

## 2022-10-10 PROBLEM — F10.10 ETOH ABUSE: Chronic | Status: ACTIVE | Noted: 2022-10-10

## 2022-10-10 LAB
BACTERIA BLD CULT: NORMAL
BACTERIA BLD CULT: NORMAL
BASOPHILS # BLD AUTO: 0.04 X10(3)/MCL (ref 0–0.2)
BASOPHILS NFR BLD AUTO: 0.7 %
EOSINOPHIL # BLD AUTO: 0.15 X10(3)/MCL (ref 0–0.9)
EOSINOPHIL NFR BLD AUTO: 2.6 %
ERYTHROCYTE [DISTWIDTH] IN BLOOD BY AUTOMATED COUNT: 14.6 % (ref 11.5–17)
HCT VFR BLD AUTO: 38.2 % (ref 37–47)
HGB BLD-MCNC: 12.4 GM/DL (ref 12–16)
IMM GRANULOCYTES # BLD AUTO: 0.01 X10(3)/MCL (ref 0–0.04)
IMM GRANULOCYTES NFR BLD AUTO: 0.2 %
LYMPHOCYTES # BLD AUTO: 0.66 X10(3)/MCL (ref 0.6–4.6)
LYMPHOCYTES NFR BLD AUTO: 11.4 %
MAGNESIUM SERPL-MCNC: 1.8 MG/DL (ref 1.6–2.6)
MCH RBC QN AUTO: 32.7 PG (ref 27–31)
MCHC RBC AUTO-ENTMCNC: 32.5 MG/DL (ref 33–36)
MCV RBC AUTO: 100.8 FL (ref 80–94)
MONOCYTES # BLD AUTO: 0.51 X10(3)/MCL (ref 0.1–1.3)
MONOCYTES NFR BLD AUTO: 8.8 %
NEUTROPHILS # BLD AUTO: 4.4 X10(3)/MCL (ref 2.1–9.2)
NEUTROPHILS NFR BLD AUTO: 76.3 %
NRBC BLD AUTO-RTO: 0 %
PLATELET # BLD AUTO: 53 X10(3)/MCL (ref 130–400)
PMV BLD AUTO: 12.2 FL (ref 7.4–10.4)
RBC # BLD AUTO: 3.79 X10(6)/MCL (ref 4.2–5.4)
WBC # SPEC AUTO: 5.8 X10(3)/MCL (ref 4.5–11.5)

## 2022-10-10 PROCEDURE — 25000003 PHARM REV CODE 250: Performed by: NURSE PRACTITIONER

## 2022-10-10 PROCEDURE — 85025 COMPLETE CBC W/AUTO DIFF WBC: CPT | Performed by: INTERNAL MEDICINE

## 2022-10-10 PROCEDURE — 83735 ASSAY OF MAGNESIUM: CPT | Performed by: INTERNAL MEDICINE

## 2022-10-10 PROCEDURE — 25000003 PHARM REV CODE 250: Performed by: INTERNAL MEDICINE

## 2022-10-10 PROCEDURE — 36415 COLL VENOUS BLD VENIPUNCTURE: CPT | Performed by: INTERNAL MEDICINE

## 2022-10-10 RX ORDER — CARVEDILOL 6.25 MG/1
6.25 TABLET ORAL 2 TIMES DAILY
Qty: 60 TABLET | Refills: 11 | Status: SHIPPED | OUTPATIENT
Start: 2022-10-10 | End: 2022-11-09 | Stop reason: SDUPTHER

## 2022-10-10 RX ORDER — TRAMADOL HYDROCHLORIDE 50 MG/1
50 TABLET ORAL EVERY 12 HOURS PRN
Qty: 14 TABLET | Refills: 0 | Status: SHIPPED | OUTPATIENT
Start: 2022-10-10 | End: 2022-10-17

## 2022-10-10 RX ORDER — TRAMADOL HYDROCHLORIDE 50 MG/1
50 TABLET ORAL EVERY 6 HOURS PRN
Status: DISCONTINUED | OUTPATIENT
Start: 2022-10-10 | End: 2022-10-10 | Stop reason: HOSPADM

## 2022-10-10 RX ORDER — FUROSEMIDE 40 MG/1
40 TABLET ORAL DAILY
Qty: 30 TABLET | Refills: 11 | Status: SHIPPED | OUTPATIENT
Start: 2022-10-11 | End: 2022-11-09 | Stop reason: SDUPTHER

## 2022-10-10 RX ORDER — SPIRONOLACTONE 50 MG/1
50 TABLET, FILM COATED ORAL 2 TIMES DAILY
Qty: 60 TABLET | Refills: 11 | Status: SHIPPED | OUTPATIENT
Start: 2022-10-10 | End: 2022-11-09 | Stop reason: SDUPTHER

## 2022-10-10 RX ORDER — FOLIC ACID 1 MG/1
1 TABLET ORAL DAILY
Qty: 30 TABLET | Refills: 2 | Status: SHIPPED | OUTPATIENT
Start: 2022-10-11 | End: 2023-06-15

## 2022-10-10 RX ADMIN — FOLIC ACID 1 MG: 1 TABLET ORAL at 10:10

## 2022-10-10 RX ADMIN — CHLORDIAZEPOXIDE HYDROCHLORIDE 25 MG: 25 CAPSULE ORAL at 10:10

## 2022-10-10 RX ADMIN — THERA TABS 1 TABLET: TAB at 10:10

## 2022-10-10 RX ADMIN — CARVEDILOL 6.25 MG: 3.12 TABLET, FILM COATED ORAL at 10:10

## 2022-10-10 RX ADMIN — COLLAGENASE SANTYL: 250 OINTMENT TOPICAL at 09:10

## 2022-10-10 RX ADMIN — SPIRONOLACTONE 50 MG: 25 TABLET ORAL at 10:10

## 2022-10-10 RX ADMIN — Medication 100 MG: at 10:10

## 2022-10-10 RX ADMIN — OXYCODONE HYDROCHLORIDE AND ACETAMINOPHEN 1 TABLET: 5; 325 TABLET ORAL at 10:10

## 2022-10-10 RX ADMIN — FUROSEMIDE 40 MG: 40 TABLET ORAL at 10:10

## 2022-10-10 NOTE — NURSING
Pt at rest on 2L O2 sat 96%. Pt O2 sat on room air at rest is 85%; after 2L placed back on pt it takes 1 minute with deep breathing to sat >90%.

## 2022-10-10 NOTE — DISCHARGE SUMMARY
[facility]  Hospital Medicine Discharge Summary    Admit Date: 10/5/2022  Discharge Date and Time: 10/10/27260:34 AM  Admitting Physician:  Team  Discharging Physician: Carlito Cast MD.  Primary Care Physician: Primary Doctor No  Consults: General Surgery and Hospital Medicine    Discharge Diagnoses:  Acute hypoxic respiratory failure, multifactorial     DD grade 2  -coreg     Vhd  -mild to moderate aortic stenosis       Cirrhosis w portal hypertension 2ry to etoh abuse/hep c(treated x 2)    Anasarca/Ascites, Coagulopathy/thrombocytopenia,   -Lasix 40 mgs po daily  -aldactone 50 mgs po bid  -coreg 6.2 mgs po bid  -etoh cessation counseling       Alcohol Use/Abuse  -alcohol cessation counseling givenand reinforced on a daily basis       Hyperbilirubinemia and Elevated AST       Hypomagnesemia  -replaced    Atraumatic Rhabdomysis   -resolving       Ground Level Falls with BLE weakness  - continue PT  - home health w PT/wound care        Superficial Wound to Bilateral Knees/Right Elbow  -post partial debridement of right knee eschar         Essential HTN  - titrate coreg        Morbid Obesity      Recurrent falls      Muscular deconditioning    Hospital Course:   Ms. Larson is [id number] female with from [facility] w history of hepatitis-C, treated in 2015, tobacco, alcohol and obesity that presents to the ED via EMS with c/o bilateral knee pain after sustaining 2 ground level falls in the past 48 hours.  Patient states that she tripped and fell and was too weak to get up yesterday and called EMS and they were able to help her get back up and she declined coming to the ED for evaluation, EMS reported that she was laying in her urine and feces.  However early this morning around 0400 she went to the bathroom and tripped /step on her cat and she fell again,  did not have her phone by her and she was too weak to move so she was on the ground for nearly 12 hours.  When EMS arrived she  was hypoxic with an SpO2 of 93% and placed on 2 L nasal cannula.  Patient denies chest pain, palpitations, unilateral weakness, vision changes prior to her falls, she is adamant that she just tripped and fell and was too weak to get up.          Patient does not have a PCP and she is not currently on any medications.  She states she had hypertension at 1 time but that was several years ago.  She endorses dyspnea on exertion, orthopnea having to use 3 and 4 pillows to sleep at night, generalized edema and abdominal swelling that is increasing in size over the past several months.  Patient states she drinks 3-4 glasses of wine 2-3 days a week. She smokes 1 ppd for several years.     Initial ED VS:  Temperature 97.9°, heart rate 108, respirations 24, blood pressure 146/80, oxygenation 96% on 2 L NC.  CT head negative for acute intracranial findings.  X-ray of the left knee shows degenerative changes with a small joint effusion, chest x-ray showed pulmonary vascular congestion.  Laboratory work remarkable for Platelets 60, PT 16.2, INR 1.32, magnesium 1.5, bilirubin 2.9, AST 87,CRP 24, sed rate 29, lactic acid 2.3 with repeat lactic acid 4 hours later resolved, serum alcohol level 32, BNP 68. Patient was given 1 L of normal saline in the ED.  She was also started on vancomycin for suspected sepsis from bilateral lower extremity cellulitis.     Patient seen and examined this evening with myself and Dr. Felix.  She endorses a history of hep C that was treated twice in 2015 and then again in 2016 with a clinical trial agent???. She is unsure how she got hepatitis-C, as she denies IV drug use or hx blood transfusion. Her lower extremities do not appeared to have cellulitis however she does have wounds with eschars to bilateral knees from probably previous falls, they do not appear to be infected.  Physical therapy was consulted and initially refused, she was seen a second time  and she was walikng in round but holding on  to furniture/walls..etc. She was able to get evaluated with recs for home health with PT.    On physical exam it appears that she had some ascites so abd us was done with findings of  Cirrhosis/patent portal vein w no significant ascites. She was started on lasix/aldactone/BB and titrated according to response. Wound care was consulted for eval of bilateral knee eschar w recs to consult surgical hospitalist for debridement. Surgical hospitalist were able to do partial debridement of right knee eschar.      Pt is from [facility]  and going through a lot since she still has family in [facility]. Pt lives in a second floor apt. Pt has a daughter that lives in [facility] and comes to help once a wek. Sometimes is very difficult to talk to her secondary to language barrier/ different culture but she means well.  has been consulted for referral to [facility]/ home health w PT and any needs for discharge. Tobacco and alcohol cessation was given and emphasized on a daily basis       Pt was seen and examined on the day of discharge  Vitals:  VITAL SIGNS: 24 HRS MIN & MAX LAST   Temp  Min: 98 °F (36.7 °C)  Max: 98.3 °F (36.8 °C) 98.3 °F (36.8 °C)   BP  Min: 115/66  Max: 155/81 115/66   Pulse  Min: 72  Max: 84  82   Resp  Min: 16  Max: 22 16   SpO2  Min: 96 %  Max: 99 % 98 %       Physical Exam:  General: Appears uncomfortable, supplemental o2 in nares, anasarca    HEENT: NC/AT    Neck:  No JVD    Chest: crackles to bilateral bases    CVS: Regular rhythm. Normal S1/S2.    Abdomen: distended, normoactive BS, soft and non-tender, some abdominal distention  not tense  - no significant ascites by abd US     MSK: No obvious deformity or joint swelling     Skin: wounds with scabs/eschars to bilateral knees and right elbow with serous drainage, good pedal pulses bilaterally  -s/p partial right knee debridement    Neuro: AAOx3, no focal neurological deficit    Psych: Cooperative    Procedures Performed: No admission procedures for  hospital encounter.     Significant Diagnostic Studies: See Full reports for all details    Recent Labs   Lab 10/07/22  0555 10/08/22  0503 10/10/22  0429   WBC 5.1 4.2* 5.8   RBC 3.87* 3.85* 3.79*   HGB 12.6 12.7 12.4   HCT 38.8 38.0 38.2   .3* 98.7* 100.8*   MCH 32.6* 33.0* 32.7*   MCHC 32.5* 33.4 32.5*   RDW 14.0 13.9 14.6   PLT 49* 52* 53*   MPV 12.2* 12.0* 12.2*       Recent Labs   Lab 10/06/22  0500 10/07/22  0555 10/08/22  0503 10/10/22  0429    139 140  --    K 3.4* 4.4 4.2  --    CO2 30 36* 36*  --    BUN 7.7* 9.5* 9.5*  --    CREATININE 0.56 0.54* 0.56  --    CALCIUM 8.0* 8.3* 8.4  --    MG 1.70 1.90 1.80 1.80   ALBUMIN 3.0* 2.7* 2.8*  --    ALKPHOS 76 69 64  --    ALT 37 29 28  --    AST 70* 38* 31  --    BILITOT 3.3* 1.5 1.4  --         Microbiology Results (last 7 days)       Procedure Component Value Units Date/Time    Blood Culture #1 **CANNOT BE ORDERED STAT** [[id number]]  (Normal) Collected: 10/05/22 1802    Order Status: Completed Specimen: Blood Updated: 10/09/22 2001     CULTURE, BLOOD ([facility]) No Growth At 96 Hours    Blood Culture #2 **CANNOT BE ORDERED STAT** [[id number]]  (Normal) Collected: 10/05/22 1802    Order Status: Completed Specimen: Blood Updated: 10/09/22 2001     CULTURE, BLOOD ([facility]) No Growth At 96 Hours             X-Ray Chest 1 View  Narrative: EXAMINATION:  XR CHEST 1 VIEW    CLINICAL HISTORY:  aspitaion pna/ nstemi;    TECHNIQUE:  Single frontal view of the chest was performed.    COMPARISON:  10/05/2022    FINDINGS:  LINES AND TUBES: EKG/telemetry leads overlie the chest.    MEDIASTINUM AND MYRON: Cardiac silhouette is at the upper limits of normal. Aortic atherosclerosis.    LUNGS: Mild vascular congestion.  Improved aeration compared to prior.  No lobar consolidation.    PLEURA:No pleural effusion. No pneumothorax.    BONES: No acute osseous abnormality.  Impression: Mild vascular congestion with improved aeration compared to prior PET    Electronically signed  by: Yadira Ken  Date:    10/09/2022  Time:    09:15         Medication List      You have not been prescribed any medications.          Explained in detail to the patient about the discharge plan, medications, and follow-up visits. Pt understands and agrees with the treatment plan  Discharge Disposition:   home w home health w PT/ wound care  Discharged Condition: stable  Diet- cardiac diet  Dietary Orders (From admission, onward)       Start     Ordered    10/05/22 2234  Diet heart healthy  (Diet/Nutrition [facility])  Diet effective now         10/05/22 2238                   Medications Per DC med rec  Activities as tolerated    For further questions contact hospitalist office    Discharge time 33 minutes    For worsening symptoms, chest pain, shortness of breath, increased abdominal pain, high grade fever, stroke or stroke like symptoms, immediately go to the nearest Emergency Room or call 911 as soon as possible.      Carlito Chau M.D, on 10/10/2022. at 9:34 AM.

## 2022-10-10 NOTE — PLAN OF CARE
Discharge planning done with patient she is agreeable to hh, rolling walker and oxygen. Would like all phone numbers on her discharge paperwork so she can call them if needed.

## 2022-10-10 NOTE — PLAN OF CARE
Referrals sent electronically to [facility]. Wendy Rajput ordered from [facility]

## 2022-10-10 NOTE — PROGRESS NOTES
[facility]N reconsulted for right knee. S/p debridement. Recommendation to continue cleansing with NS. Apply santyl, cover with moistened 4 x 4, cover with abd pad, Wrap with Kerlix, change BID. Dressings changed today. Patient tolerated well. Will follow up in a few days.        10/10/22 1152        Altered Skin Integrity 10/05/22 Right anterior Knee   Date First Assessed: 10/05/22   Altered Skin Integrity Present on Admission: yes  Side: Right  Orientation: anterior  Location: Knee   Wound Image    Dressing Appearance Dry;Intact   Drainage Amount Moderate   Drainage Characteristics/Odor Serosanguineous   Appearance Pink;Red;Slough;Eschar   Tissue loss description Full thickness   Black (%), Wound Tissue Color 50 %   Red (%), Wound Tissue Color 30 %   Yellow (%), Wound Tissue Color 20 %   Periwound Area Redness;Swelling;Warm   Wound Edges Defined   Wound Length (cm) 6.5 cm   Wound Width (cm) 7 cm   Wound Depth (cm)   (unknown)   Wound Surface Area (cm^2) 45.5 cm^2   Care Cleansed with:;Sterile normal saline   Dressing Applied        Altered Skin Integrity 10/05/22 Left anterior Knee   Date First Assessed: 10/05/22   Altered Skin Integrity Present on Admission: yes  Side: Left  Orientation: anterior  Location: Knee   Wound Image    Description of Altered Skin Integrity Full thickness tissue loss. Base is covered by slough and/or eschar in the wound bed   Dressing Appearance Dry;Intact   Drainage Amount Small   Drainage Characteristics/Odor Serosanguineous   Appearance Slough;Eschar   Black (%), Wound Tissue Color 80 %   Yellow (%), Wound Tissue Color 20 %   Wound Edges Defined   Wound Length (cm) 5 cm   Wound Width (cm) 7 cm   Wound Depth (cm)   (unknown)   Wound Surface Area (cm^2) 35 cm^2   Care Cleansed with:;Sterile normal saline   Dressing Applied        Altered Skin Integrity 10/05/22 Right posterior Elbow   Date First Assessed: 10/05/22   Altered Skin Integrity Present on Admission: yes  Side: Right  Orientation:  posterior  Location: Elbow   Wound Image    Description of Altered Skin Integrity Partial thickness tissue loss. Shallow open ulcer with a red or pink wound bed, without slough. Intact or Open/Ruptured Serum-filled blister.   Dressing Appearance Dry;Intact   Drainage Amount Scant   Drainage Characteristics/Odor Serous   Appearance Pink   Red (%), Wound Tissue Color 100 %   Periwound Area Intact   Wound Edges Defined   Wound Length (cm) 0.5 cm   Wound Width (cm) 0.5 cm   Wound Surface Area (cm^2) 0.25 cm^2   Care Cleansed with:;Sterile normal saline   Dressing Applied        Altered Skin Integrity 10/07/22 1347 Left lower;lateral Leg Full thickness tissue loss. Subcutaneous fat may be visible but bone, tendon or muscle are not exposed   Date First Assessed/Time First Assessed: 10/07/22 1347   Altered Skin Integrity Present on Admission: yes  Side: Left  Orientation: lower;lateral  Location: Leg  Description of Altered Skin Integrity: Full thickness tissue loss. Subcutaneous fat may b...   Wound Image    Dressing Appearance Dry;Intact   Drainage Amount Scant   Drainage Characteristics/Odor Serous   Appearance Red   Red (%), Wound Tissue Color 100 %   Periwound Area Swelling   Wound Edges Defined   Wound Length (cm) 1 cm   Wound Width (cm) 2 cm   Wound Surface Area (cm^2) 2 cm^2   Care Cleansed with:;Sterile normal saline   Dressing Applied

## 2022-10-10 NOTE — DISCHARGE INSTRUCTIONS
1- no alcohol....ever!!!!  2- stop smoking  3- low salt diet  4- follow up with [facility] for primary care physician and gastroenterology  5- follow up with home health for physical therapy and wound care

## 2022-10-12 ENCOUNTER — PATIENT OUTREACH (OUTPATIENT)
Dept: ADMINISTRATIVE | Facility: CLINIC | Age: 62
End: 2022-10-12
Payer: COMMERCIAL

## 2022-10-12 NOTE — PROGRESS NOTES
C3 nurse attempted to contact Katharina Larson   for a TCC post hospital discharge follow up call. No answer at phone number listed.

## 2022-10-24 ENCOUNTER — DOCUMENT SCAN (OUTPATIENT)
Dept: HOME HEALTH SERVICES | Facility: HOSPITAL | Age: 62
End: 2022-10-24
Payer: COMMERCIAL

## 2022-10-31 ENCOUNTER — OFFICE VISIT (OUTPATIENT)
Dept: GASTROENTEROLOGY | Facility: CLINIC | Age: 62
End: 2022-10-31
Payer: COMMERCIAL

## 2022-10-31 VITALS
WEIGHT: 272.81 LBS | OXYGEN SATURATION: 96 % | SYSTOLIC BLOOD PRESSURE: 116 MMHG | BODY MASS INDEX: 42.73 KG/M2 | HEART RATE: 83 BPM | RESPIRATION RATE: 20 BRPM | TEMPERATURE: 98 F | DIASTOLIC BLOOD PRESSURE: 64 MMHG

## 2022-10-31 DIAGNOSIS — B19.20 COMPENSATED HCV CIRRHOSIS: Primary | ICD-10-CM

## 2022-10-31 DIAGNOSIS — K74.69 COMPENSATED HCV CIRRHOSIS: Primary | ICD-10-CM

## 2022-10-31 DIAGNOSIS — K70.31 ALCOHOLIC CIRRHOSIS OF LIVER WITH ASCITES: Chronic | ICD-10-CM

## 2022-10-31 DIAGNOSIS — F10.10 ETOH ABUSE: Chronic | ICD-10-CM

## 2022-10-31 DIAGNOSIS — K70.30 ALCOHOLIC CIRRHOSIS, UNSPECIFIED WHETHER ASCITES PRESENT: Primary | Chronic | ICD-10-CM

## 2022-10-31 PROCEDURE — 99214 OFFICE O/P EST MOD 30 MIN: CPT | Mod: PBBFAC | Performed by: STUDENT IN AN ORGANIZED HEALTH CARE EDUCATION/TRAINING PROGRAM

## 2022-10-31 NOTE — PROGRESS NOTES
Subjective:       Patient ID: Katharina Larson is a 61 y.o. female.    Chief Complaint: liver cirrhosis/ascites (2-week post hospital follow-up. She's requesting a prescription for Tramadol for back pain rating 10/10 on pain scale.)    61-year-old mom [place] female, presents with her daughter today, is a follow-up from a hospitalization at [facility] earlier this month for decompensated cirrhotic liver disease.  Etiology of patient's liver disease from hepatitis C apparently treated twice in 2015 and then again in 2016 while she lived in [facility], unsure how she contracted hepatitis-C, denies IV drug use or history of blood transfusions or tattoos.  During the hospitalization she had sustained 2 ground level falls and had been lying in her urine and feces.  She had been endorsing some orthopnea but mainly was describing generalized edema and abdominal swelling crease inside the past month.  Was drinking 3-4 glasses of wine 2 or 3 days a week.  Also a smoker for several years.  Her ultrasound the abdomen was performed and reveal any significant ascites although seems that initially on physical exam she did have some ascites.  She was discharged home on Lasix and Aldactone is already lost 20 lb since that hospital stay does believe she was getting more abdominal distention last year prior to this hospital stay.  We also sent home on a beta blocker.  Main complaint today is of back pain, leg pain, she contributes to her significant weight gain in the past likely due to her ascites and anasarca.  Has maintain abstinence from alcohol.  Has been compliant with her Lasix and Aldactone and Coreg.  Requesting tramadol today for pain. She needs a PCP.  Most updated ultrasound was done at hospital stay on 10/05 that revealed cirrhotic appearing liver with no masses present.  Most recent MELD 15, child Reyna class B.  Review of Systems   Constitutional:  Positive for fatigue.   HENT: Negative.     Eyes: Negative.    Respiratory:  Negative.     Cardiovascular:  Positive for leg swelling.   Endocrine: Negative.    Genitourinary: Negative.    Musculoskeletal:  Positive for back pain and leg pain.   Integumentary:  Negative.   Allergic/Immunologic: Negative.    Neurological: Negative.    Hematological: Negative.    Psychiatric/Behavioral: Negative.         Objective:   Vitals:    10/31/22 1344   BP: 116/64   Pulse: 83   Resp: 20   Temp: 98 °F (36.7 °C)           Physical Exam  Constitutional:       Appearance: Normal appearance. She is obese.   HENT:      Head: Normocephalic and atraumatic.      Mouth/Throat:      Mouth: Mucous membranes are moist.      Pharynx: Oropharynx is clear.   Cardiovascular:      Rate and Rhythm: Normal rate and regular rhythm.   Pulmonary:      Effort: Pulmonary effort is normal.      Breath sounds: Normal breath sounds.   Abdominal:      General: Bowel sounds are normal.      Palpations: Abdomen is soft.   Musculoskeletal:      Right lower leg: Edema present.      Left lower leg: Edema present.      Comments: Bilateral venous stasis dermatitis present bilateral lower extremities.   Skin:     General: Skin is warm and dry.   Neurological:      General: No focal deficit present.      Mental Status: She is alert and oriented to person, place, and time. Mental status is at baseline.   Psychiatric:         Mood and Affect: Mood normal.         Thought Content: Thought content normal.         Judgment: Judgment normal.       Assessment:       Problem List Items Addressed This Visit          Psychiatric    ETOH abuse (Chronic)       GI    Alcoholic cirrhosis (Chronic)     Other Visit Diagnoses       Compensated HCV cirrhosis    -  Primary    Relevant Orders    US Abdomen Limited_Liver    Ambulatory referral/consult to Internal Medicine              Plan:       Background:  Alcohol: yes, in the past    Tobacco: yes    Liver disease: HCV and alcohol    MELD-Na: 15  Child-Reyna Class: B    Transplant: not under evaluation, low  MELD    Cirrhosis is decompensated with:    Ascites: no  Spontaneous bacterial peritonitis: No  Hepatic Encephalopathy: No  Hepatocellular carcinoma: No  Hepatorenal syndrome: No  Hyponatremia: No  Muscle wasting: No  Portal vein thrombosis: No    Screening:  Last EGD: N/A      Last imaging study:  Abd US 10/05/2022 cirrhotic appearing liver, no masses or lesions present.    CIRRHOSIS COUNSELING:  - strict abstinence of alcohol use  - low sodium (salt) 2000mg per day  - Nutrition: 25-30kcal (calorie per body weight in kilogram) per day  - No need to restrict protein in diet  - High protein diet: 1.2-1.5 gram/kg (protein per body weight in kg) per day to prevent muscle mass loss  - Resistance exercises for muscle strength  - Avoid raw seafoods due to risk of fatal Vibrio vulnificus infection  - Avoid Non-steroidal anti-inflammatory drugs (NSAIDs) such as ibuprofen, Motrin, naproxen, Aleve due to risk of kidney damage  - Can take acetaminophen (tylenol), no more than 2000mg per day  - Compliance with all medications  - Ultrasound or MRI of the liver every 6 months for liver cancer screening  - Upper endoscopy every 1-2 years to screen for varices    Six month follow-up, repeat abdominal ultrasound be performed April 2023.    continue Lasix 40 mg, Aldactone 100 mg, Coreg 6.25 mg b.i.d..    Will arrange for outpatient EGD to be performed.    referral to Internal Medicine Clinic to establish with the PCP.    Need osteoporosis screening.

## 2022-11-07 ENCOUNTER — PATIENT MESSAGE (OUTPATIENT)
Dept: GASTROENTEROLOGY | Facility: CLINIC | Age: 62
End: 2022-11-07
Payer: COMMERCIAL

## 2022-11-09 ENCOUNTER — PATIENT MESSAGE (OUTPATIENT)
Dept: GASTROENTEROLOGY | Facility: CLINIC | Age: 62
End: 2022-11-09
Payer: COMMERCIAL

## 2022-11-09 RX ORDER — CARVEDILOL 6.25 MG/1
6.25 TABLET ORAL 2 TIMES DAILY
Qty: 60 TABLET | Refills: 11 | Status: SHIPPED | OUTPATIENT
Start: 2022-11-09 | End: 2022-11-12 | Stop reason: SDUPTHER

## 2022-11-09 RX ORDER — SPIRONOLACTONE 50 MG/1
50 TABLET, FILM COATED ORAL 2 TIMES DAILY
Qty: 60 TABLET | Refills: 11 | Status: SHIPPED | OUTPATIENT
Start: 2022-11-09 | End: 2022-11-09

## 2022-11-09 RX ORDER — FUROSEMIDE 40 MG/1
40 TABLET ORAL DAILY
Qty: 30 TABLET | Refills: 11 | Status: SHIPPED | OUTPATIENT
Start: 2022-11-09 | End: 2023-11-09

## 2022-11-09 RX ORDER — SPIRONOLACTONE 50 MG/1
50 TABLET, FILM COATED ORAL 2 TIMES DAILY
Qty: 60 TABLET | Refills: 11 | Status: SHIPPED | OUTPATIENT
Start: 2022-11-09 | End: 2023-11-09

## 2022-11-09 RX ORDER — CARVEDILOL 6.25 MG/1
6.25 TABLET ORAL 2 TIMES DAILY
Qty: 60 TABLET | Refills: 11 | Status: SHIPPED | OUTPATIENT
Start: 2022-11-09 | End: 2022-11-09

## 2022-11-09 RX ORDER — FUROSEMIDE 40 MG/1
40 TABLET ORAL DAILY
Qty: 30 TABLET | Refills: 11 | Status: SHIPPED | OUTPATIENT
Start: 2022-11-09 | End: 2022-11-09

## 2023-05-01 ENCOUNTER — OFFICE VISIT (OUTPATIENT)
Dept: GASTROENTEROLOGY | Facility: CLINIC | Age: 63
End: 2023-05-01
Payer: COMMERCIAL

## 2023-05-01 VITALS
HEIGHT: 67 IN | TEMPERATURE: 98 F | BODY MASS INDEX: 45.04 KG/M2 | RESPIRATION RATE: 16 BRPM | WEIGHT: 287 LBS | HEART RATE: 89 BPM | DIASTOLIC BLOOD PRESSURE: 77 MMHG | SYSTOLIC BLOOD PRESSURE: 121 MMHG

## 2023-05-01 DIAGNOSIS — B19.20 COMPENSATED HCV CIRRHOSIS: Primary | ICD-10-CM

## 2023-05-01 DIAGNOSIS — K74.69 COMPENSATED HCV CIRRHOSIS: Primary | ICD-10-CM

## 2023-05-01 LAB
ALBUMIN SERPL-MCNC: 3.6 G/DL (ref 3.4–4.8)
ALBUMIN/GLOB SERPL: 0.9 RATIO (ref 1.1–2)
ALP SERPL-CCNC: 73 UNIT/L (ref 40–150)
ALT SERPL-CCNC: 15 UNIT/L (ref 0–55)
AST SERPL-CCNC: 18 UNIT/L (ref 5–34)
BASOPHILS # BLD AUTO: 0.04 X10(3)/MCL
BASOPHILS NFR BLD AUTO: 0.8 %
BILIRUBIN DIRECT+TOT PNL SERPL-MCNC: 1.4 MG/DL
BUN SERPL-MCNC: 15.3 MG/DL (ref 9.8–20.1)
CALCIUM SERPL-MCNC: 9.2 MG/DL (ref 8.4–10.2)
CHLORIDE SERPL-SCNC: 105 MMOL/L (ref 98–107)
CO2 SERPL-SCNC: 28 MMOL/L (ref 23–31)
CREAT SERPL-MCNC: 0.68 MG/DL (ref 0.55–1.02)
EOSINOPHIL # BLD AUTO: 0.13 X10(3)/MCL (ref 0–0.9)
EOSINOPHIL NFR BLD AUTO: 2.5 %
ERYTHROCYTE [DISTWIDTH] IN BLOOD BY AUTOMATED COUNT: 14.1 % (ref 11.5–17)
GFR SERPLBLD CREATININE-BSD FMLA CKD-EPI: >60 MLS/MIN/1.73/M2
GLOBULIN SER-MCNC: 3.8 GM/DL (ref 2.4–3.5)
GLUCOSE SERPL-MCNC: 144 MG/DL (ref 82–115)
HCT VFR BLD AUTO: 40.5 % (ref 37–47)
HGB BLD-MCNC: 13.7 G/DL (ref 12–16)
IMM GRANULOCYTES # BLD AUTO: 0.01 X10(3)/MCL (ref 0–0.04)
IMM GRANULOCYTES NFR BLD AUTO: 0.2 %
LYMPHOCYTES # BLD AUTO: 1.08 X10(3)/MCL (ref 0.6–4.6)
LYMPHOCYTES NFR BLD AUTO: 20.4 %
MCH RBC QN AUTO: 32.2 PG (ref 27–31)
MCHC RBC AUTO-ENTMCNC: 33.8 G/DL (ref 33–36)
MCV RBC AUTO: 95.3 FL (ref 80–94)
MONOCYTES # BLD AUTO: 0.59 X10(3)/MCL (ref 0.1–1.3)
MONOCYTES NFR BLD AUTO: 11.2 %
NEUTROPHILS # BLD AUTO: 3.44 X10(3)/MCL (ref 2.1–9.2)
NEUTROPHILS NFR BLD AUTO: 64.9 %
NRBC BLD AUTO-RTO: 0 %
PLATELET # BLD AUTO: 53 X10(3)/MCL (ref 130–400)
PMV BLD AUTO: 12.6 FL (ref 7.4–10.4)
POTASSIUM SERPL-SCNC: 4 MMOL/L (ref 3.5–5.1)
PROT SERPL-MCNC: 7.4 GM/DL (ref 5.8–7.6)
RBC # BLD AUTO: 4.25 X10(6)/MCL (ref 4.2–5.4)
SODIUM SERPL-SCNC: 143 MMOL/L (ref 136–145)
TSH SERPL-ACNC: 2.76 UIU/ML (ref 0.35–4.94)
WBC # SPEC AUTO: 5.29 X10(3)/MCL (ref 4.5–11.5)

## 2023-05-01 PROCEDURE — 85025 COMPLETE CBC W/AUTO DIFF WBC: CPT | Performed by: STUDENT IN AN ORGANIZED HEALTH CARE EDUCATION/TRAINING PROGRAM

## 2023-05-01 PROCEDURE — 84443 ASSAY THYROID STIM HORMONE: CPT | Performed by: STUDENT IN AN ORGANIZED HEALTH CARE EDUCATION/TRAINING PROGRAM

## 2023-05-01 PROCEDURE — 80053 COMPREHEN METABOLIC PANEL: CPT | Performed by: STUDENT IN AN ORGANIZED HEALTH CARE EDUCATION/TRAINING PROGRAM

## 2023-05-01 PROCEDURE — 99213 OFFICE O/P EST LOW 20 MIN: CPT | Mod: PBBFAC | Performed by: STUDENT IN AN ORGANIZED HEALTH CARE EDUCATION/TRAINING PROGRAM

## 2023-05-01 PROCEDURE — 85610 PROTHROMBIN TIME: CPT | Performed by: STUDENT IN AN ORGANIZED HEALTH CARE EDUCATION/TRAINING PROGRAM

## 2023-05-01 PROCEDURE — 36415 COLL VENOUS BLD VENIPUNCTURE: CPT | Performed by: STUDENT IN AN ORGANIZED HEALTH CARE EDUCATION/TRAINING PROGRAM

## 2023-05-01 NOTE — PROGRESS NOTES
Subjective     Patient ID: Katharina Larson is a 62 y.o. female.    Chief Complaint: HCV Cirrhosis (C/O of abd swelling which is decreasing her ability to do things.  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  Etiology of patient's liver disease from hepatitis C apparently treated twice in 2015 and then again in 2016 while she lived in [facility], unsure how she contracted hepatitis-C, denies IV drug use or history of blood transfusions or tattoos.  During the hospitalization she had sustained 2 ground level falls and had been lying in her urine and feces.  She had been endorsing some orthopnea but mainly was describing generalized edema and abdominal swelling crease inside the past month.  Was drinking 3-4 glasses of wine 2 or 3 days a week.  Also a smoker for several years.  Her ultrasound the abdomen was performed and reveal any significant ascites although seems that initially on physical exam she did have some ascites.  She was discharged home on Lasix and Aldactone is already lost 20 lb since that hospital stay does believe she was getting more abdominal distention last year prior to this hospital stay.  We also sent home on a beta blocker.  Main complaint today is of back pain, leg pain, she contributes to her significant weight gain in the past likely due to her ascites and anasarca.  Has maintain abstinence from alcohol.  Has been compliant with her Lasix and Aldactone and Coreg.  Requesting tramadol today for pain. She needs a PCP.  Most updated ultrasound was done at hospital stay on 10/05 that revealed cirrhotic appearing liver with no masses present.  Most recent MELD 15, child Reyna class B.    Today's visit:  Complaint today of worsening abdominal distention over the last 2 months, states had gradually been losing weight since last visit in October, and been doing  well but then started experiencing worsening distention and has had associated urge incontinence, as well as on and off use of her Lasix, states she was getting nauseous apparently after taking a couple of her doses.  We will need updated MELD labs this point, states she has had some lab work done with an outside practitioner and [facility] but does not know these results.  Improvement in lower extremity swelling, no melena, hematochezia hematemesis, no jaundice.  No alcohol use, has also been avoiding NSAIDs, only takes Tylenol as needed for pain.  Will also need updated abdominal ultrasound for HCC screening this month.  Review of Systems   Constitutional:  Positive for fatigue.   HENT: Negative.     Eyes: Negative.    Respiratory: Negative.     Cardiovascular: Negative.    Gastrointestinal:  Positive for abdominal distention, abdominal pain and nausea.   Endocrine: Negative.    Genitourinary:  Positive for bladder incontinence and urgency.   Allergic/Immunologic: Negative.    Neurological: Negative.    Hematological: Negative.    Psychiatric/Behavioral: Negative.          Objective   Vitals:    05/01/23 1322   BP: 121/77   Pulse: 89   Resp: 16   Temp: 98.2 °F (36.8 °C)         Physical Exam  Constitutional:       Appearance: She is obese.   HENT:      Head: Normocephalic and atraumatic.      Mouth/Throat:      Mouth: Mucous membranes are moist.      Pharynx: Oropharynx is clear.   Eyes:      Conjunctiva/sclera: Conjunctivae normal.      Pupils: Pupils are equal, round, and reactive to light.   Cardiovascular:      Rate and Rhythm: Normal rate and regular rhythm.      Pulses: Normal pulses.      Heart sounds: Normal heart sounds.   Pulmonary:      Effort: Pulmonary effort is normal.      Breath sounds: Normal breath sounds.   Abdominal:      General: Bowel sounds are normal. There is distension.   Musculoskeletal:         General: Normal range of motion.   Skin:     General: Skin is warm and dry.   Neurological:       General: No focal deficit present.      Mental Status: She is alert and oriented to person, place, and time. Mental status is at baseline.        Assessment and Plan     Problem List Items Addressed This Visit    None  Visit Diagnoses       Compensated HCV cirrhosis    -  Primary            Background:  Alcohol: yes, in the past    Tobacco: yes    Liver disease: HCV and alcohol    MELD-Na: 15, need updated MELD labs  Child-Reyna Class: B    Transplant: not under evaluation, low MELD    Cirrhosis is decompensated with:    Ascites: no  Spontaneous bacterial peritonitis: No  Hepatic Encephalopathy: No  Hepatocellular carcinoma: No  Hepatorenal syndrome: No  Hyponatremia: No  Muscle wasting: No  Portal vein thrombosis: No    Screening:  Last EGD: N/A      Last imaging study:  Abd US 10/05/2022 cirrhotic appearing liver, no masses or lesions present.    CIRRHOSIS COUNSELING:  - strict abstinence of alcohol use  - low sodium (salt) 2000mg per day  - Nutrition: 25-30kcal (calorie per body weight in kilogram) per day  - No need to restrict protein in diet  - High protein diet: 1.2-1.5 gram/kg (protein per body weight in kg) per day to prevent muscle mass loss  - Resistance exercises for muscle strength  - Avoid raw seafoods due to risk of fatal Vibrio vulnificus infection  - Avoid Non-steroidal anti-inflammatory drugs (NSAIDs) such as ibuprofen, Motrin, naproxen, Aleve due to risk of kidney damage  - Can take acetaminophen (tylenol), no more than 2000mg per day  - Compliance with all medications  - Ultrasound or MRI of the liver every 6 months for liver cancer screening  - Upper endoscopy every 1-2 years to screen for varices     Six month follow-up, needs updated abdominal ultrasound for HCC screening ASAP, also on in setting of worsening abdominal distention over the past couple of months.    Will also place referral to Internal Medicine Clinic needs a PCP asap in the setting of other comorbidities, urge incontinence,  also evaluating a TSH level today given worsening abdominal distention, weight gain, venous stasis dermatitis,   MELD labs today.    Needs an EGD later this year.      Update, 05/03/2023:  Labs returned, MELD 10, Child Reyna class A.  Thyroid function normal.

## 2023-06-15 ENCOUNTER — HOSPITAL ENCOUNTER (INPATIENT)
Facility: HOSPITAL | Age: 63
LOS: 1 days | Discharge: HOME | DRG: 441 | End: 2023-06-16
Attending: INTERNAL MEDICINE | Admitting: INTERNAL MEDICINE
Payer: COMMERCIAL

## 2023-06-15 ENCOUNTER — OFFICE VISIT (OUTPATIENT)
Dept: INTERNAL MEDICINE | Facility: CLINIC | Age: 63
DRG: 441 | End: 2023-06-15
Payer: COMMERCIAL

## 2023-06-15 VITALS
SYSTOLIC BLOOD PRESSURE: 135 MMHG | HEART RATE: 103 BPM | TEMPERATURE: 99 F | DIASTOLIC BLOOD PRESSURE: 88 MMHG | RESPIRATION RATE: 18 BRPM | BODY MASS INDEX: 45.57 KG/M2 | HEIGHT: 67 IN | WEIGHT: 290.38 LBS

## 2023-06-15 DIAGNOSIS — K70.30 ALCOHOLIC CIRRHOSIS OF LIVER WITHOUT ASCITES: Chronic | ICD-10-CM

## 2023-06-15 DIAGNOSIS — Z12.11 SCREENING FOR COLON CANCER: ICD-10-CM

## 2023-06-15 DIAGNOSIS — I81 PORTAL VEIN THROMBOSIS: Primary | ICD-10-CM

## 2023-06-15 DIAGNOSIS — R06.02 SHORTNESS OF BREATH: ICD-10-CM

## 2023-06-15 DIAGNOSIS — K74.69 OTHER CIRRHOSIS OF LIVER: ICD-10-CM

## 2023-06-15 DIAGNOSIS — Z72.0 TOBACCO ABUSE: Primary | Chronic | ICD-10-CM

## 2023-06-15 DIAGNOSIS — Z12.39 ENCOUNTER FOR SCREENING FOR MALIGNANT NEOPLASM OF BREAST, UNSPECIFIED SCREENING MODALITY: ICD-10-CM

## 2023-06-15 DIAGNOSIS — K70.31 ALCOHOLIC CIRRHOSIS OF LIVER WITH ASCITES: Chronic | ICD-10-CM

## 2023-06-15 PROBLEM — I82.0 BUDD-CHIARI SYNDROME: Status: ACTIVE | Noted: 2023-06-15

## 2023-06-15 LAB
(HCYS)2 SERPL-MCNC: 7.3 UMOL/L (ref 5.1–15.4)
ALBUMIN SERPL-MCNC: 3.5 G/DL (ref 3.4–4.8)
ALBUMIN/GLOB SERPL: 0.9 RATIO (ref 1.1–2)
ALP SERPL-CCNC: 72 UNIT/L (ref 40–150)
ALT SERPL-CCNC: 12 UNIT/L (ref 0–55)
APTT PPP: 38.9 SECONDS
AST SERPL-CCNC: 17 UNIT/L (ref 5–34)
BASOPHILS # BLD AUTO: 0.03 X10(3)/MCL
BASOPHILS NFR BLD AUTO: 0.6 %
BILIRUBIN DIRECT+TOT PNL SERPL-MCNC: 1.3 MG/DL
BNP BLD-MCNC: 104.6 PG/ML
BUN SERPL-MCNC: 15.8 MG/DL (ref 9.8–20.1)
CALCIUM SERPL-MCNC: 8.9 MG/DL (ref 8.4–10.2)
CHLORIDE SERPL-SCNC: 106 MMOL/L (ref 98–107)
CO2 SERPL-SCNC: 25 MMOL/L (ref 23–31)
CREAT SERPL-MCNC: 0.7 MG/DL (ref 0.55–1.02)
CRP SERPL-MCNC: 5.6 MG/L
EOSINOPHIL # BLD AUTO: 0.18 X10(3)/MCL (ref 0–0.9)
EOSINOPHIL NFR BLD AUTO: 3.4 %
ERYTHROCYTE [DISTWIDTH] IN BLOOD BY AUTOMATED COUNT: 14.5 % (ref 11.5–17)
ERYTHROCYTE [SEDIMENTATION RATE] IN BLOOD: 26 MM/HR (ref 0–20)
FIBRINOGEN PPP-MCNC: 320 MG/DL (ref 210–463)
GFR SERPLBLD CREATININE-BSD FMLA CKD-EPI: >60 MLS/MIN/1.73/M2
GLOBULIN SER-MCNC: 3.9 GM/DL (ref 2.4–3.5)
GLUCOSE SERPL-MCNC: 145 MG/DL (ref 82–115)
HCT VFR BLD AUTO: 41.7 % (ref 37–47)
HGB BLD-MCNC: 13.9 G/DL (ref 12–16)
IMM GRANULOCYTES # BLD AUTO: 0.01 X10(3)/MCL (ref 0–0.04)
IMM GRANULOCYTES NFR BLD AUTO: 0.2 %
LDH SERPL-CCNC: 210 U/L (ref 125–220)
LYMPHOCYTES # BLD AUTO: 0.93 X10(3)/MCL (ref 0.6–4.6)
LYMPHOCYTES NFR BLD AUTO: 17.3 %
MAGNESIUM SERPL-MCNC: 1.7 MG/DL (ref 1.6–2.6)
MCH RBC QN AUTO: 31.3 PG (ref 27–31)
MCHC RBC AUTO-ENTMCNC: 33.3 G/DL (ref 33–36)
MCV RBC AUTO: 93.9 FL (ref 80–94)
MONOCYTES # BLD AUTO: 0.52 X10(3)/MCL (ref 0.1–1.3)
MONOCYTES NFR BLD AUTO: 9.7 %
NEUTROPHILS # BLD AUTO: 3.7 X10(3)/MCL (ref 2.1–9.2)
NEUTROPHILS NFR BLD AUTO: 68.8 %
NRBC BLD AUTO-RTO: 0 %
PHOSPHATE SERPL-MCNC: 3.8 MG/DL (ref 2.3–4.7)
PLATELET # BLD AUTO: 59 X10(3)/MCL (ref 130–400)
PLATELETS.RETICULATED NFR BLD AUTO: 4.1 % (ref 0.9–11.2)
PMV BLD AUTO: 11.2 FL (ref 7.4–10.4)
POTASSIUM SERPL-SCNC: 3.7 MMOL/L (ref 3.5–5.1)
PROT SERPL-MCNC: 7.4 GM/DL (ref 5.8–7.6)
RBC # BLD AUTO: 4.44 X10(6)/MCL (ref 4.2–5.4)
SODIUM SERPL-SCNC: 140 MMOL/L (ref 136–145)
TROPONIN I SERPL-MCNC: 0.02 NG/ML (ref 0–0.04)
WBC # SPEC AUTO: 5.37 X10(3)/MCL (ref 4.5–11.5)

## 2023-06-15 PROCEDURE — 25000003 PHARM REV CODE 250

## 2023-06-15 PROCEDURE — 85384 FIBRINOGEN ACTIVITY: CPT

## 2023-06-15 PROCEDURE — 83695 ASSAY OF LIPOPROTEIN(A): CPT

## 2023-06-15 PROCEDURE — 83880 ASSAY OF NATRIURETIC PEPTIDE: CPT | Performed by: PHYSICIAN ASSISTANT

## 2023-06-15 PROCEDURE — 99285 EMERGENCY DEPT VISIT HI MDM: CPT | Mod: 25,27

## 2023-06-15 PROCEDURE — 80053 COMPREHEN METABOLIC PANEL: CPT | Performed by: PHYSICIAN ASSISTANT

## 2023-06-15 PROCEDURE — 25500020 PHARM REV CODE 255: Performed by: INTERNAL MEDICINE

## 2023-06-15 PROCEDURE — 11000001 HC ACUTE MED/SURG PRIVATE ROOM

## 2023-06-15 PROCEDURE — 99214 OFFICE O/P EST MOD 30 MIN: CPT | Mod: PBBFAC,25 | Performed by: INTERNAL MEDICINE

## 2023-06-15 PROCEDURE — 83735 ASSAY OF MAGNESIUM: CPT

## 2023-06-15 PROCEDURE — 86140 C-REACTIVE PROTEIN: CPT

## 2023-06-15 PROCEDURE — 85025 COMPLETE CBC W/AUTO DIFF WBC: CPT | Performed by: PHYSICIAN ASSISTANT

## 2023-06-15 PROCEDURE — 86147 CARDIOLIPIN ANTIBODY EA IG: CPT

## 2023-06-15 PROCEDURE — 83090 ASSAY OF HOMOCYSTEINE: CPT

## 2023-06-15 PROCEDURE — 83615 LACTATE (LD) (LDH) ENZYME: CPT

## 2023-06-15 PROCEDURE — 84484 ASSAY OF TROPONIN QUANT: CPT | Performed by: PHYSICIAN ASSISTANT

## 2023-06-15 PROCEDURE — 85610 PROTHROMBIN TIME: CPT | Performed by: PHYSICIAN ASSISTANT

## 2023-06-15 PROCEDURE — 85730 THROMBOPLASTIN TIME PARTIAL: CPT

## 2023-06-15 PROCEDURE — 85300 ANTITHROMBIN III ACTIVITY: CPT

## 2023-06-15 PROCEDURE — 21400001 HC TELEMETRY ROOM

## 2023-06-15 PROCEDURE — 85613 RUSSELL VIPER VENOM DILUTED: CPT

## 2023-06-15 PROCEDURE — 84100 ASSAY OF PHOSPHORUS: CPT

## 2023-06-15 PROCEDURE — 85303 CLOT INHIBIT PROT C ACTIVITY: CPT

## 2023-06-15 PROCEDURE — 85652 RBC SED RATE AUTOMATED: CPT

## 2023-06-15 PROCEDURE — 63600175 PHARM REV CODE 636 W HCPCS

## 2023-06-15 RX ORDER — PROPRANOLOL HYDROCHLORIDE 20 MG/1
20 TABLET ORAL 2 TIMES DAILY
Status: DISCONTINUED | OUTPATIENT
Start: 2023-06-15 | End: 2023-06-16 | Stop reason: HOSPADM

## 2023-06-15 RX ORDER — ACETAMINOPHEN 500 MG
1000 TABLET ORAL EVERY 6 HOURS PRN
COMMUNITY

## 2023-06-15 RX ORDER — PANTOPRAZOLE SODIUM 40 MG/1
40 TABLET, DELAYED RELEASE ORAL DAILY
Status: DISCONTINUED | OUTPATIENT
Start: 2023-06-15 | End: 2023-06-16 | Stop reason: HOSPADM

## 2023-06-15 RX ORDER — FUROSEMIDE 20 MG/1
40 TABLET ORAL DAILY
Status: DISCONTINUED | OUTPATIENT
Start: 2023-06-16 | End: 2023-06-16 | Stop reason: HOSPADM

## 2023-06-15 RX ORDER — PANTOPRAZOLE SODIUM 40 MG/1
40 TABLET, DELAYED RELEASE ORAL DAILY
Status: DISCONTINUED | OUTPATIENT
Start: 2023-06-16 | End: 2023-06-15

## 2023-06-15 RX ORDER — SPIRONOLACTONE 25 MG/1
50 TABLET ORAL 2 TIMES DAILY
Status: DISCONTINUED | OUTPATIENT
Start: 2023-06-15 | End: 2023-06-16 | Stop reason: HOSPADM

## 2023-06-15 RX ORDER — MAGNESIUM SULFATE HEPTAHYDRATE 40 MG/ML
2 INJECTION, SOLUTION INTRAVENOUS ONCE
Status: COMPLETED | OUTPATIENT
Start: 2023-06-15 | End: 2023-06-15

## 2023-06-15 RX ORDER — SODIUM CHLORIDE 0.9 % (FLUSH) 0.9 %
10 SYRINGE (ML) INJECTION
Status: DISCONTINUED | OUTPATIENT
Start: 2023-06-15 | End: 2023-06-16 | Stop reason: HOSPADM

## 2023-06-15 RX ORDER — HEPARIN SODIUM,PORCINE/D5W 25000/250
0-40 INTRAVENOUS SOLUTION INTRAVENOUS CONTINUOUS
Status: DISCONTINUED | OUTPATIENT
Start: 2023-06-15 | End: 2023-06-16 | Stop reason: HOSPADM

## 2023-06-15 RX ORDER — TALC
6 POWDER (GRAM) TOPICAL NIGHTLY PRN
Status: DISCONTINUED | OUTPATIENT
Start: 2023-06-15 | End: 2023-06-16 | Stop reason: HOSPADM

## 2023-06-15 RX ADMIN — SPIRONOLACTONE 50 MG: 25 TABLET, FILM COATED ORAL at 09:06

## 2023-06-15 RX ADMIN — PROPRANOLOL HYDROCHLORIDE 20 MG: 20 TABLET ORAL at 09:06

## 2023-06-15 RX ADMIN — HEPARIN SODIUM 18 UNITS/KG/HR: 10000 INJECTION, SOLUTION INTRAVENOUS at 06:06

## 2023-06-15 RX ADMIN — MAGNESIUM SULFATE HEPTAHYDRATE 2 G: 40 INJECTION, SOLUTION INTRAVENOUS at 08:06

## 2023-06-15 RX ADMIN — PANTOPRAZOLE SODIUM 40 MG: 40 TABLET, DELAYED RELEASE ORAL at 06:06

## 2023-06-15 RX ADMIN — Medication 6 MG: at 09:06

## 2023-06-15 RX ADMIN — IOHEXOL 100 ML: 350 INJECTION, SOLUTION INTRAVENOUS at 04:06

## 2023-06-15 NOTE — ED PROVIDER NOTES
Encounter Date: 6/15/2023       History     Chief Complaint   Patient presents with    Shortness of Breath     SOB, abd pain, dizziness, hx of liver cirrhosis, hospitalized 8  months ago, no paracentesis done at that time.     Referred from IM clinic due to Hx of cirrhosis secondary to Hep C with abdominal distention that may required therapeutic paracentesis. Pt states abdomen distention worsening for the last few months, also associated to shortness of breath. Pt denies fever, vomiting, diarrhea. Taking lasix. No other medical problems    The history is provided by the patient and a relative. The history is limited by a language barrier. No  was used (Daughter translate).   Review of patient's allergies indicates:  No Known Allergies  Past Medical History:   Diagnosis Date    Unspecified viral hepatitis C without hepatic coma      Past Surgical History:   Procedure Laterality Date    APPENDECTOMY      right knee replacement      TONSILLECTOMY       No family history on file.  Social History     Tobacco Use    Smoking status: Every Day     Packs/day: 0.50     Types: Cigarettes    Smokeless tobacco: Never   Substance Use Topics    Alcohol use: Not Currently    Drug use: Never     Review of Systems   Constitutional:  Negative for fever.   HENT:  Negative for sore throat.    Respiratory:  Positive for shortness of breath.    Cardiovascular:  Negative for chest pain.   Gastrointestinal:  Positive for abdominal distention. Negative for nausea.   Genitourinary:  Negative for dysuria.   Musculoskeletal:  Negative for back pain.   Skin:  Negative for rash.   Neurological:  Negative for weakness.   Hematological:  Does not bruise/bleed easily.   All other systems reviewed and are negative.    Physical Exam     Initial Vitals [06/15/23 1247]   BP Pulse Resp Temp SpO2   (!) 146/93 78 18 97.5 °F (36.4 °C) (!) 94 %      MAP       --         Physical Exam    Nursing note and vitals reviewed.  Constitutional:  She appears well-developed and well-nourished. No distress.   HENT:   Head: Normocephalic and atraumatic.   Mouth/Throat: Oropharynx is clear and moist.   Eyes: Conjunctivae and EOM are normal. Pupils are equal, round, and reactive to light.   Neck: Neck supple. No thyromegaly present. No tracheal deviation present. No JVD present.   Normal range of motion.  Cardiovascular:  Normal rate, regular rhythm, normal heart sounds and intact distal pulses.           Pulmonary/Chest: Breath sounds normal.   Abdominal: Abdomen is soft. Bowel sounds are normal. She exhibits no distension. There is no abdominal tenderness.   Globus abdomen, No ascitic wave; Caput medusae and petechiae noticed There is no rebound and no guarding.   Musculoskeletal:         General: No tenderness or edema. Normal range of motion.      Cervical back: Normal range of motion and neck supple.     Lymphadenopathy:     She has no cervical adenopathy.   Neurological: She is alert and oriented to person, place, and time. She has normal strength.   Skin: Skin is warm and dry. No rash noted.   Hyperpigmented lower extremities with petechiae   Psychiatric: Her behavior is normal.       ED Course   Procedures  Labs Reviewed   COMPREHENSIVE METABOLIC PANEL - Abnormal; Notable for the following components:       Result Value    Glucose Level 145 (*)     Globulin 3.9 (*)     Albumin/Globulin Ratio 0.9 (*)     All other components within normal limits   B-TYPE NATRIURETIC PEPTIDE - Abnormal; Notable for the following components:    Natriuretic Peptide 104.6 (*)     All other components within normal limits   CBC WITH DIFFERENTIAL - Abnormal; Notable for the following components:    MCH 31.3 (*)     Platelet 59 (*)     MPV 11.2 (*)     All other components within normal limits   TROPONIN I - Normal   CBC W/ AUTO DIFFERENTIAL    Narrative:     The following orders were created for panel order CBC auto differential.  Procedure                                Abnormality         Status                     ---------                               -----------         ------                     CBC with Differential[[id number]]        Abnormal            Final result                 Please view results for these tests on the individual orders.   PROTIME-INR   EXTRA TUBES    Narrative:     The following orders were created for panel order EXTRA TUBES.  Procedure                               Abnormality         Status                     ---------                               -----------         ------                     Red Top Hold[[id number]]                                     In process                 Pink Top Hold[[id number]]                                    In process                   Please view results for these tests on the individual orders.   RED TOP HOLD   PINK TOP HOLD          Imaging Results               CT Abdomen Pelvis With Contrast (Final result)  Result time 06/15/23 16:24:10      Final result by Joshua Dupree MD (06/15/23 16:24:10)                   Impression:      Findings seen consistent with thrombus in the superior mesenteric vein and the portal vein at the portosplenic confluence.  Thrombus is partially occlusive.    Enlarged cirrhotic appearing liver    Multiple gastric and esophageal varices    Splenomegaly    Cholelithiasis    This report was flagged in Epic as abnormal.      Electronically signed by: Joshua Dupree  Date:    06/15/2023  Time:    16:24               Narrative:    EXAMINATION:  CT ABDOMEN PELVIS WITH CONTRAST    CLINICAL HISTORY:  Abdominal pain, acute, nonlocalized;    TECHNIQUE:  Low dose axial images, sagittal and coronal reformations were obtained from the lung bases to the pubic symphysis following the IV administration of contrast. Automatic exposure control (AEC) is utilized to reduce patient radiation exposure.    COMPARISON:  None.    FINDINGS:  There is bibasilar atelectasis..    The liver is enlarged  with a diffusely nodular contour consistent with cirrhosis.  No liver mass or lesion is seen.    There is a filling defect seen within the portosplenic confluence as well as the superior mesenteric vein.  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  It is partially occlusive.    There is some mesenteric edema seen most likely related to the mesenteric vein thrombosis..    There are multiple gallstones in the gallbladder.  The gallbladder is distended.  There is some esophageal and gastric varices seen..    The pancreas appears normal.  No pancreatic mass or lesion is seen.    The spleen is enlarged in size..    The adrenal glands appear normal.  No adrenal nodule is seen.    The kidneys appear normal.  No hydronephrosis is seen.  No hydroureter is seen.  No nephrolithiasis is seen.  No obvious ureteral stones are seen.    Urinary bladder appears grossly unremarkable.    There is laxity of the anterior abdominal wall musculature in the periumbilical region and some outpouching of the intra-abdominal contents in the region.  No colitis is seen.  No diverticulitis is seen.  No obvious colonic mass or lesion is seen.    No free air is seen.  No free fluid is seen.                                       CTA Chest Non-Coronary (PE Studies) (Final result)  Result time 06/15/23 16:18:48      Final result by Telly Cates MD (06/15/23 16:18:48)                   Impression:      1.  No pulmonary embolism identified.    2.  Cardiomegaly and mild congestive changes.      Electronically signed by: Telly Cates  Date:    06/15/2023  Time:    16:18               Narrative:    EXAMINATION:  CTA CHEST NON CORONARY (PE STUDIES)    CLINICAL HISTORY:  Pulmonary embolism (PE) suspected, high prob;    TECHNIQUE:  Sequential axial images performed of the chest using pulmonary embolism protocol following intravenous contrast bolus. Sagittal and contrast reformations performed.    Dose product  length of 1412 mGycm. Automated exposure control was utilized to minimize radiation dose.    COMPARISON:  Chest CT Madhuri 15, 2023    FINDINGS:  Optimal contrast bolus timing with adequately opacified pulmonary artery system is without evidence of filling defects from pulmonary thromboembolism within the main pulmonary artery and the major branches.  Thoracic aorta is without aneurysmal dilatation or dissection.  Cardiac chambers are enlarged in size.  There are no enlarged chest lymph nodes.    Lungs are remarkable for mild congestive changes.  Bilateral lower lung lobes hypoventilatory changes and/or scarring.  There is no dense consolidation focally or segmentally and there is no fluid within the pleural and the pericardial spaces.    Abdomen findings are described on separate report.  Thoracic kyphosis and degenerative changes.                                       X-Ray Chest AP Portable (Final result)  Result time 06/15/23 13:57:37      Final result by Renny Vallecillo MD (06/15/23 13:57:37)                   Narrative:    EXAMINATION  XR CHEST AP PORTABLE    CLINICAL HISTORY  Shortness of breath    TECHNIQUE  A total of 1 frontal image(s) submitted of the chest.    COMPARISON  9 October 2022    FINDINGS  Lines/tubes/devices: None visualized.    The cardiac silhouette and central vascular structures are unchanged.  The trachea is midline. No new or worsening consolidation is developed in the interval.  There is no large pleural effusion or convincing pneumothorax.    Regional osseous structures and extrathoracic soft tissues are similar.    IMPRESSION  No acute process or other adverse interval change.      Electronically signed by: Renny Vallecillo  Date:    06/15/2023  Time:    13:57                                     Medications   iohexoL (OMNIPAQUE 350) 350 mg iodine/mL injection (has no administration in time range)   iohexoL (OMNIPAQUE 350) injection 100 mL (100 mLs Intravenous Given 6/15/23 1605)     Medical  Decision Making:   History:   I obtained history from: primary care / consultant and someone other than patient.       <> Summary of History: Pts daughter  Differential Diagnosis:   Ascites, malignancy, obstruction  Independently Interpreted Test(s):   I have ordered and independently interpreted X-rays - see prior notes.  Clinical Tests:   Lab Tests: Ordered  Radiological Study: Ordered  Other:   I have discussed this case with another health care provider.       <> Summary of the Discussion: 4:34 PM Consult: I discussed the case with Dr. Ness ([facility]). Agrees with current management.   Recommends will see in ED                          Clinical Impression:   Final diagnoses:  [R06.02] Shortness of breath  [I81] Portal vein thrombosis (Primary)        ED Disposition Condition    Observation Stable                Carlito Rowe MD  06/15/23 4819

## 2023-06-15 NOTE — H&P
[facility]   History & Physical Note     Resident Team: [facility] Medicine List 2  Attending Physician: Herbert Rajput MD  Date of Admit: 6/15/2023    Chief Complaint:     Shortness of Breath (SOB, abd pain, dizziness, hx of liver cirrhosis, hospitalized 8  months ago, no paracentesis done at that time.)       Subjective:      History of Present Illness:  Katharina Larson is a 62 y.o. 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    Patient states that she is been having increasing abdominal distention as well as suprapubic pain over the course of 4 months gradual.  She has a history of cirrhosis due to hepatitis-C with no previous EGD.  She is not had previous imaging done of her abdomen.  Today she was seen in Internal Medicine Clinic and due to concern for the abdominal distention thought to have ascites and for evaluation with possible paracentesis and was sent to the emergency department.  She was also having shortness of breath at this time though no fever, vomiting, diarrhea, melena, hematochezia.    In the ED patient's vital signs were stable, though given shortness of breath and an O2 saturation of 94% obtained CT PE with concern for pulmonary embolism though none was identified.  Subsequently due to evaluation not consistent with a ascites though with gradual abdominal distention and concern for hepatocellular carcinoma CT abdomen and pelvis with contrast was obtained showing thrombus in the SMV and portal vein along with splenomegaly and mesenteric edema.  Medicine was consulted for evaluation of thrombi in SMV at portal vein.      Past Medical History:   has a past medical history of Unspecified viral hepatitis C without hepatic coma.     Past Surgical History:   has a past surgical history that includes Tonsillectomy; right knee replacement; and Appendectomy.     Family  History:  family history is not on file.     Social History:   reports that she has been smoking cigarettes. She has been smoking an average of .5 packs per day. She has never used smokeless tobacco. She reports that she does not currently use alcohol. She reports that she does not use drugs.     Allergies:  has No Known Allergies.     Home Medications:  Prior to Admission medications    Medication Sig Start Date End Date Taking? Authorizing Provider   acetaminophen (TYLENOL) 500 MG tablet Take 1,000 mg by mouth every 6 (six) hours as needed for Pain.    Historical Provider   carvediloL (COREG) 6.25 MG tablet Take 1 tablet (6.25 mg total) by mouth 2 (two) times daily.  Patient not taking: Reported on 5/1/2023 11/13/22 11/13/23  Herbert Rajput MD   folic acid (FOLVITE) 1 MG tablet Take 1 tablet (1 mg total) by mouth once daily. 10/11/22 6/15/23  Carlito Cast MD   furosemide (LASIX) 40 MG tablet Take 1 tablet (40 mg total) by mouth once daily. 11/9/22 11/9/23  Herbert Rajput MD   spironolactone (ALDACTONE) 50 MG tablet Take 1 tablet (50 mg total) by mouth 2 (two) times daily. 11/9/22 11/9/23  Herbert Rajput MD         Review of Systems:  Review of Systems   Constitutional:  Negative for chills, fever and weight loss.   HENT:  Negative for congestion, hearing loss and sinus pain.    Eyes:  Negative for blurred vision and double vision.   Respiratory:  Positive for shortness of breath.    Cardiovascular:  Positive for leg swelling. Negative for chest pain, palpitations and orthopnea.   Gastrointestinal:  Positive for abdominal pain. Negative for blood in stool, constipation, heartburn, melena and nausea.   Genitourinary:  Negative for dysuria, frequency and urgency.   Musculoskeletal:  Positive for back pain. Negative for myalgias.   Skin:  Negative for rash.   Neurological:  Negative for dizziness and weakness.   Endo/Heme/Allergies:  Does not bruise/bleed easily.   Psychiatric/Behavioral:  Negative for depression.            Objective:     Vital Signs (Most Recent):  Temp: 97.5 °F (36.4 °C) (06/15/23 1247)  Pulse: 78 (06/15/23 1247)  Resp: 18 (06/15/23 1247)  BP: (!) 146/93 (06/15/23 1247)  SpO2: (!) 94 % (06/15/23 1247) Vital Signs (24h Range):  Temp:  [97.5 °F (36.4 °C)-98.7 °F (37.1 °C)] 97.5 °F (36.4 °C)  Pulse:  [] 78  Resp:  [18] 18  SpO2:  [94 %] 94 %  BP: (135-146)/(88-93) 146/93       Physical Examination:  Physical Exam  Constitutional:       Appearance: Normal appearance. She is obese.   HENT:      Head: Normocephalic and atraumatic.      Nose: No congestion.      Mouth/Throat:      Mouth: Mucous membranes are moist.      Pharynx: Oropharynx is clear.   Eyes:      Extraocular Movements: Extraocular movements intact.      Conjunctiva/sclera: Conjunctivae normal.      Pupils: Pupils are equal, round, and reactive to light.   Neck:      Comments: + JVD  Cardiovascular:      Rate and Rhythm: Normal rate and regular rhythm.      Heart sounds: No murmur heard.  Pulmonary:      Effort: Pulmonary effort is normal. No respiratory distress.      Breath sounds: Normal breath sounds.   Abdominal:      General: There is distension.      Tenderness: There is no abdominal tenderness. There is no guarding.      Comments: Caput Medusae   Musculoskeletal:      Cervical back: Normal range of motion.      Right lower leg: Edema present.      Left lower leg: Edema present.      Comments: +2 Pitting  Venous stasis dermatitis   Skin:     General: Skin is warm.      Capillary Refill: Capillary refill takes less than 2 seconds.   Neurological:      General: No focal deficit present.      Mental Status: She is alert and oriented to person, place, and time.      Motor: No weakness.   Psychiatric:         Mood and Affect: Mood normal.         Behavior: Behavior normal.         Laboratory:  Most Recent Data:  CBC:   Lab Results   Component Value Date    WBC 5.37 06/15/2023    HGB 13.9 06/15/2023    HCT 41.7 06/15/2023    PLT 59 (L)  06/15/2023    MCV 93.9 06/15/2023    RDW 14.5 06/15/2023     BMP:   Lab Results   Component Value Date     06/15/2023    K 3.7 06/15/2023    CHLORIDE 106 06/15/2023    CO2 25 06/15/2023    BUN 15.8 06/15/2023    CREATININE 0.70 06/15/2023    GLUCOSE 145 (H) 06/15/2023    CALCIUM 8.9 06/15/2023     LFTs:   Lab Results   Component Value Date    ALBUMIN 3.5 06/15/2023    BILITOT 1.3 06/15/2023    AST 17 06/15/2023    ALKPHOS 72 06/15/2023    ALT 12 06/15/2023     Coags:   Lab Results   Component Value Date    INR 1.23 06/15/2023    PROTIME 15.3 06/15/2023    PTT 29.8 10/05/2022     FLP: No results found for: CHOL, HDL, LDL, TRIG  DM:   Lab Results   Component Value Date    CREATININE 0.70 06/15/2023     Thyroid:   Lab Results   Component Value Date    TSH 2.760 05/01/2023     Anemia: No results found for: IRON, FERRITIN, TRANS, TIBC, [id number], FOLATE  Cardiac:   Lab Results   Component Value Date    TROPONINI 0.017 06/15/2023     (H) 10/07/2022    .6 (H) 06/15/2023     Urinalysis:   Lab Results   Component Value Date    PHUA 6.5 10/05/2022    UROBILINOGEN 4.0 (A) 10/05/2022    WBCUA <5 10/05/2022       Trended Cardiac Data:  Recent Labs   Lab 06/15/23  1317   TROPONINI 0.017   .6*       Radiology:  Imaging Results               CT Abdomen Pelvis With Contrast (Final result)  Result time 06/15/23 16:24:10      Final result by Joshua Dupree MD (06/15/23 16:24:10)                   Impression:      Findings seen consistent with thrombus in the superior mesenteric vein and the portal vein at the portosplenic confluence.  Thrombus is partially occlusive.    Enlarged cirrhotic appearing liver    Multiple gastric and esophageal varices    Splenomegaly    Cholelithiasis    This report was flagged in Epic as abnormal.      Electronically signed by: Joshua Dupree  Date:    06/15/2023  Time:    16:24               Narrative:    EXAMINATION:  CT ABDOMEN PELVIS WITH CONTRAST    CLINICAL  HISTORY:  Abdominal pain, acute, nonlocalized;    TECHNIQUE:  Low dose axial images, sagittal and coronal reformations were obtained from the lung bases to the pubic symphysis following the IV administration of contrast. Automatic exposure control (AEC) is utilized to reduce patient radiation exposure.    COMPARISON:  None.    FINDINGS:  There is bibasilar atelectasis..    The liver is enlarged with a diffusely nodular contour consistent with cirrhosis.  No liver mass or lesion is seen.    There is a filling defect seen within the portosplenic confluence as well as the superior mesenteric vein.  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  It is partially occlusive.    There is some mesenteric edema seen most likely related to the mesenteric vein thrombosis..    There are multiple gallstones in the gallbladder.  The gallbladder is distended.  There is some esophageal and gastric varices seen..    The pancreas appears normal.  No pancreatic mass or lesion is seen.    The spleen is enlarged in size..    The adrenal glands appear normal.  No adrenal nodule is seen.    The kidneys appear normal.  No hydronephrosis is seen.  No hydroureter is seen.  No nephrolithiasis is seen.  No obvious ureteral stones are seen.    Urinary bladder appears grossly unremarkable.    There is laxity of the anterior abdominal wall musculature in the periumbilical region and some outpouching of the intra-abdominal contents in the region.  No colitis is seen.  No diverticulitis is seen.  No obvious colonic mass or lesion is seen.    No free air is seen.  No free fluid is seen.                                       CTA Chest Non-Coronary (PE Studies) (Final result)  Result time 06/15/23 16:18:48      Final result by Telly Cates MD (06/15/23 16:18:48)                   Impression:      1.  No pulmonary embolism identified.    2.  Cardiomegaly and mild congestive changes.      Electronically signed  by: Telly Cates  Date:    06/15/2023  Time:    16:18               Narrative:    EXAMINATION:  CTA CHEST NON CORONARY (PE STUDIES)    CLINICAL HISTORY:  Pulmonary embolism (PE) suspected, high prob;    TECHNIQUE:  Sequential axial images performed of the chest using pulmonary embolism protocol following intravenous contrast bolus. Sagittal and contrast reformations performed.    Dose product length of 1412 mGycm. Automated exposure control was utilized to minimize radiation dose.    COMPARISON:  Chest CT Madhuri 15, 2023    FINDINGS:  Optimal contrast bolus timing with adequately opacified pulmonary artery system is without evidence of filling defects from pulmonary thromboembolism within the main pulmonary artery and the major branches.  Thoracic aorta is without aneurysmal dilatation or dissection.  Cardiac chambers are enlarged in size.  There are no enlarged chest lymph nodes.    Lungs are remarkable for mild congestive changes.  Bilateral lower lung lobes hypoventilatory changes and/or scarring.  There is no dense consolidation focally or segmentally and there is no fluid within the pleural and the pericardial spaces.    Abdomen findings are described on separate report.  Thoracic kyphosis and degenerative changes.                                       X-Ray Chest AP Portable (Final result)  Result time 06/15/23 13:57:37      Final result by Renny Vallecillo MD (06/15/23 13:57:37)                   Narrative:    EXAMINATION  XR CHEST AP PORTABLE    CLINICAL HISTORY  Shortness of breath    TECHNIQUE  A total of 1 frontal image(s) submitted of the chest.    COMPARISON  9 October 2022    FINDINGS  Lines/tubes/devices: None visualized.    The cardiac silhouette and central vascular structures are unchanged.  The trachea is midline. No new or worsening consolidation is developed in the interval.  There is no large pleural effusion or convincing pneumothorax.    Regional osseous structures and extrathoracic soft tissues  are similar.    IMPRESSION  No acute process or other adverse interval change.      Electronically signed by: Renny Vallecillo  Date:    06/15/2023  Time:    13:57                                       Lines/Drains/Airways       Peripheral Intravenous Line  Duration                  Peripheral IV - Single Lumen 06/15/23 1504 20 G Anterior;Left Forearm <1 day                     Assessment & Plan:   Budd-Chiari syndrome   Cirrhosis secondary to hepatitis-C infection (treated 2015 at 2016)   Multiple esophageal and gastric varices   - gradual abdominal distention over the course of 4 months, suprapubic pain relieved with voiding  -due to concern for malignancy CT abdomen and pelvis with contrast was obtained revealing thrombus in SMV and portal vein as well as mesenteric edema and splenomegaly   -multiple esophageal and gastric varices also seen on CT, we will place patient on propranolol at this time as well as Protonix  -we will place patient on high-dose heparin drip  -PT and INR within normal limits   -we will obtain hyper coagulopathy workup  -plan to consult vascular surgery in the morning for possible vascular intervention  -meld 9  -child Reyna class A    Cholelithiasis  -patient currently asymptomatic, we will see ctm      CODE STATUS: Full Code  Access: Peripheral  Antibiotics: none  Diet: Cardiac, low sodium 2g  DVT Prophylaxis: Heparin gtt  GI Prophylaxis: proton pump inhibitor per orders  Fluids: none    Disposition: day 0 of admission for  Budd-Chiari syndrome, coagulating with high-dose heparin drip, plan to consult surgery in the morning for vascular intervention, hypercoagulable workup    Cornel Ness MD  [facility] PGY-1

## 2023-06-15 NOTE — PROGRESS NOTES
[facility] INTERNAL MEDICINE  OUTPATIENT OFFICE VISIT NOTE    SUBJECTIVE:   CC- ESTABLISH CARE  HPI:   61-year-old mom German female, presents with her daughter today, to establish care with PCP.    Recently seen by Dr Rajput in May 2023 for decomp Liver Cirrhosis.   Etiology of patient's liver disease from hepatitis C apparently treated twice in 2015 and then again in 2016 while she lived in [facility], unsure how she contracted hepatitis-C, denies IV drug use or history of blood transfusions or tattoos.      During the previous hospitalization pt c/o some orthopnea but mainly was generalized edema and abdominal swelling since two months.      Admits to drinking 3-4 glasses of wine 2 or 3 days a week.  Also a smoker for several years.      She has been on Lasix and Aldactone and Coreg.    Main complaint today is shortness of breath and worsening abdominal distension.  The patient and daughter both state that in the last few weeks she has been increasingly short of breath.  Even some activity around the house makes him short winded.  Also abdominal is now much more distended and she has been noticing increasing swelling over the feet.  She has not been taking Coreg however has been compliant the rest of the medications.  She denies any vomiting    No alcohol.  Has been compliant with her Lasix and Aldactone and Coreg.   Most updated ultrasound was done at hospital stay on 10/05 that revealed cirrhotic appearing liver with no masses present.    Most recent MELD 10,  child Reyna class A  Improved from MELD of 15 and Child Reyna B        Per Epic-   Repeat USG ordered but not done  EGD- Dec 2023  TSH- WNL  Labs- May 2023- Platelets- 53- at baseline since 8 mths          - INR- 1.24  ECHO- Oct 2022- EF=65%      ROS:  CONSTITUTIONAL: Denies weight loss, fever and chills.  HEENT: Denies changes in vision and hearing.  ?RESPIRATORY: Denies SOB and cough.?  CV: Denies palpitations and CP. ?  GI: Denies abdominal pain, nausea,  vomiting and diarrhea.?  : Denies dysuria and urinary frequency.?  MSK: Denies myalgia and joint pain.?  SKIN: Denies rash and pruritus.  ?NEUROLOGICAL: Denies headache and syncope.?  PSYCHIATRIC: Denies recent changes in mood. Denies anxiety and depression.     OBJECTIVE:     Physical Examination:    Vital signs:     Vitals:    06/15/23 1212   BP: 135/88   Pulse: 103   Resp: 18   Temp: 98.7 °F (37.1 °C)        General: Well nourished w/o distress- APPEARS TIRED and mildly dyspnoic  HEENT: NC/AT; PERRLA; nasal and oral mucosa moist and clear; no sinus tenderness; no thyromegaly  Neck: Full ROM; no lymphadenopathy  Pulm: CTA bilaterally, normal work of breathing  CV: S1, S2 w/o murmurs or gallops; +3 pitting edema noted  GI: marked ABD distension   MSK: Full ROM of all extremities and spine w/o limitation or discomfort  Derm: No rashes, abnormal bruising, or skin lesions  Neuro: AAOx4; CN II-XII intact; motor/sensory function intact  Psych: Cooperative; appropriate mood and affect           ASSESSMENT & PLAN:     Liver Cirrhosis  Hep- C  Pt needs to have an USG and EGD completed  Will attempt to get her scheduled   Currently on Aldactone, Lasix and Coreg  Given the degree of abdominal distention and progressively worsening short of breath -at this time would be prudent for the patient to be evaluated in the emergency room for possible diagnostic and therapeutic paracentesis   Called and spoke with Dr Howard    2. Pap smear  Referred to GYN    3. Mammogram  Ordered    4. COLON CANCER   Cologuard ordered            No follow-ups on file.     Daria Mireles MD

## 2023-06-15 NOTE — FIRST PROVIDER EVALUATION
"Medical screening examination initiated.  I have conducted a focused provider triage encounter, findings are as follows:    Brief history of present illness:  Patient is a 62 y.o. female with a history of cirrhosis who presents to the ED with abdominal distention and bilateral LE edema. Has never required paracentesis. Daughter reports pt has also been short of breath.     Daughter translating, pt speaks Frisian.     Vitals:    06/15/23 1247   BP: (!) 146/93   Pulse: 78   Resp: 18   Temp: 97.5 °F (36.4 °C)   TempSrc: Temporal   SpO2: (!) 94%   Weight: 131.5 kg (290 lb)   Height: 5' 7" (1.702 m)       Pertinent physical exam:  abdominal distention, bilateral LE edema    Brief workup plan:  labs, imaging deferred to assigned provider    Preliminary workup initiated; this workup will be continued and followed by the physician or advanced practice provider that is assigned to the patient when roomed.  "

## 2023-06-16 VITALS
RESPIRATION RATE: 20 BRPM | HEART RATE: 72 BPM | BODY MASS INDEX: 45.67 KG/M2 | HEIGHT: 67 IN | DIASTOLIC BLOOD PRESSURE: 92 MMHG | WEIGHT: 291 LBS | OXYGEN SATURATION: 92 % | SYSTOLIC BLOOD PRESSURE: 146 MMHG | TEMPERATURE: 97 F

## 2023-06-16 LAB
AFP-TM SERPL-MCNC: 6.3 NG/ML
ALBUMIN SERPL-MCNC: 3.2 G/DL (ref 3.4–4.8)
ALBUMIN/GLOB SERPL: 0.9 RATIO (ref 1.1–2)
ALP SERPL-CCNC: 68 UNIT/L (ref 40–150)
ALT SERPL-CCNC: 12 UNIT/L (ref 0–55)
APTT PPP: 138.6 SECONDS
APTT PPP: 79.8 SECONDS
AST SERPL-CCNC: 16 UNIT/L (ref 5–34)
BASOPHILS # BLD AUTO: 0.04 X10(3)/MCL
BASOPHILS NFR BLD AUTO: 0.7 %
BILIRUBIN DIRECT+TOT PNL SERPL-MCNC: 0.9 MG/DL
BUN SERPL-MCNC: 12.8 MG/DL (ref 9.8–20.1)
CALCIUM SERPL-MCNC: 8.6 MG/DL (ref 8.4–10.2)
CANCER AG19-9 SERPL-ACNC: 7.79 UNIT/ML (ref 0–37)
CEA SERPL-MCNC: 6.93 NG/ML (ref 0–3)
CHLORIDE SERPL-SCNC: 106 MMOL/L (ref 98–107)
CO2 SERPL-SCNC: 25 MMOL/L (ref 23–31)
CREAT SERPL-MCNC: 0.64 MG/DL (ref 0.55–1.02)
EOSINOPHIL # BLD AUTO: 0.16 X10(3)/MCL (ref 0–0.9)
EOSINOPHIL NFR BLD AUTO: 3 %
ERYTHROCYTE [DISTWIDTH] IN BLOOD BY AUTOMATED COUNT: 14.5 % (ref 11.5–17)
GFR SERPLBLD CREATININE-BSD FMLA CKD-EPI: >60 MLS/MIN/1.73/M2
GLOBULIN SER-MCNC: 3.7 GM/DL (ref 2.4–3.5)
GLUCOSE SERPL-MCNC: 139 MG/DL (ref 82–115)
HCT VFR BLD AUTO: 40.6 % (ref 37–47)
HGB BLD-MCNC: 13.5 G/DL (ref 12–16)
IMM GRANULOCYTES # BLD AUTO: 0.01 X10(3)/MCL (ref 0–0.04)
IMM GRANULOCYTES NFR BLD AUTO: 0.2 %
LYMPHOCYTES # BLD AUTO: 0.87 X10(3)/MCL (ref 0.6–4.6)
LYMPHOCYTES NFR BLD AUTO: 16.2 %
MAGNESIUM SERPL-MCNC: 2 MG/DL (ref 1.6–2.6)
MCH RBC QN AUTO: 31.8 PG (ref 27–31)
MCHC RBC AUTO-ENTMCNC: 33.3 G/DL (ref 33–36)
MCV RBC AUTO: 95.5 FL (ref 80–94)
MONOCYTES # BLD AUTO: 0.53 X10(3)/MCL (ref 0.1–1.3)
MONOCYTES NFR BLD AUTO: 9.9 %
NEUTROPHILS # BLD AUTO: 3.75 X10(3)/MCL (ref 2.1–9.2)
NEUTROPHILS NFR BLD AUTO: 70 %
NRBC BLD AUTO-RTO: 0 %
PHOSPHATE SERPL-MCNC: 4.3 MG/DL (ref 2.3–4.7)
PLATELET # BLD AUTO: 53 X10(3)/MCL (ref 130–400)
PLATELETS.RETICULATED NFR BLD AUTO: 4.5 % (ref 0.9–11.2)
PMV BLD AUTO: 11.2 FL (ref 7.4–10.4)
POTASSIUM SERPL-SCNC: 4.2 MMOL/L (ref 3.5–5.1)
PROT SERPL-MCNC: 6.9 GM/DL (ref 5.8–7.6)
RBC # BLD AUTO: 4.25 X10(6)/MCL (ref 4.2–5.4)
SODIUM SERPL-SCNC: 140 MMOL/L (ref 136–145)
WBC # SPEC AUTO: 5.36 X10(3)/MCL (ref 4.5–11.5)

## 2023-06-16 PROCEDURE — 84100 ASSAY OF PHOSPHORUS: CPT

## 2023-06-16 PROCEDURE — 81240 F2 GENE: CPT | Performed by: STUDENT IN AN ORGANIZED HEALTH CARE EDUCATION/TRAINING PROGRAM

## 2023-06-16 PROCEDURE — 83735 ASSAY OF MAGNESIUM: CPT

## 2023-06-16 PROCEDURE — 25000003 PHARM REV CODE 250

## 2023-06-16 PROCEDURE — 85730 THROMBOPLASTIN TIME PARTIAL: CPT | Mod: 91 | Performed by: STUDENT IN AN ORGANIZED HEALTH CARE EDUCATION/TRAINING PROGRAM

## 2023-06-16 PROCEDURE — 85025 COMPLETE CBC W/AUTO DIFF WBC: CPT

## 2023-06-16 PROCEDURE — 80053 COMPREHEN METABOLIC PANEL: CPT

## 2023-06-16 PROCEDURE — 94761 N-INVAS EAR/PLS OXIMETRY MLT: CPT

## 2023-06-16 PROCEDURE — 81241 F5 GENE: CPT | Performed by: STUDENT IN AN ORGANIZED HEALTH CARE EDUCATION/TRAINING PROGRAM

## 2023-06-16 PROCEDURE — 82378 CARCINOEMBRYONIC ANTIGEN: CPT

## 2023-06-16 PROCEDURE — 86301 IMMUNOASSAY TUMOR CA 19-9: CPT

## 2023-06-16 PROCEDURE — 82105 ALPHA-FETOPROTEIN SERUM: CPT

## 2023-06-16 PROCEDURE — 63600175 PHARM REV CODE 636 W HCPCS

## 2023-06-16 RX ORDER — PROPRANOLOL HYDROCHLORIDE 20 MG/1
20 TABLET ORAL 2 TIMES DAILY
Qty: 60 TABLET | Refills: 11 | Status: SHIPPED | OUTPATIENT
Start: 2023-06-16 | End: 2024-06-15

## 2023-06-16 RX ADMIN — FUROSEMIDE 40 MG: 20 TABLET ORAL at 08:06

## 2023-06-16 RX ADMIN — HEPARIN SODIUM 15 UNITS/KG/HR: 10000 INJECTION, SOLUTION INTRAVENOUS at 05:06

## 2023-06-16 RX ADMIN — SPIRONOLACTONE 50 MG: 25 TABLET, FILM COATED ORAL at 08:06

## 2023-06-16 RX ADMIN — PANTOPRAZOLE SODIUM 40 MG: 40 TABLET, DELAYED RELEASE ORAL at 08:06

## 2023-06-16 RX ADMIN — PROPRANOLOL HYDROCHLORIDE 20 MG: 20 TABLET ORAL at 08:06

## 2023-06-16 NOTE — DISCHARGE SUMMARY
[facility] Internal Medicine Discharge Summary    Admitting Physician: Maru Gardiner MD  Attending Physician: Herbert Rajput MD  Date of Admit: 6/15/2023  Date of Discharge: 6/16/2023    Condition: Good  Outcome: Patient tolerated treatment/procedure well without complication and is now ready for discharge.  DISPOSITION: Home or Self Care        Discharge Diagnoses:     Patient Active Problem List   Diagnosis    Alcoholic cirrhosis    Falls frequently    Tobacco abuse    ETOH abuse    Muscular deconditioning    Budd-Chiari syndrome    Other cirrhosis of liver       Principal Problem:  Budd-Chiari syndrome    Consultants and Procedures:     Consultants:  IP CONSULT TO HOSPITAL MEDICINE  IP CONSULT TO GENERAL SURGERY    Procedures:   * No surgery found *      Brief Admission History:      Katharina Larson is a 62 y.o. female who with a history of cirrhosis secondary to hepatitis-C infection (treated 2015 and 2016) who presented to [facility] ED on 6/15/2023  with complaint of abdominal distention.     Patient states that she is been having increasing abdominal distention as well as suprapubic pain over the course of 4 months gradual.  She has a history of cirrhosis due to hepatitis-C with no previous EGD.  She is not had previous imaging done of her abdomen.  Today she was seen in Internal Medicine Clinic and due to concern for the abdominal distention thought to have ascites and for evaluation with possible paracentesis and was sent to the emergency department.  She was also having shortness of breath at this time though no fever, vomiting, diarrhea, melena, hematochezia.     In the ED patient's vital signs were stable, though given shortness of breath and an O2 saturation of 94% obtained CT PE with concern for pulmonary embolism though none was identified.  Subsequently due to evaluation not consistent with a ascites though with gradual abdominal distention and concern for hepatocellular carcinoma CT abdomen and pelvis with contrast  "was obtained showing thrombus in the SMV and portal vein along with splenomegaly and mesenteric edema.  Medicine was consulted for evaluation of thrombi in SMV at portal vein.       Hospital Course with Pertinent Findings:      Patient was admitted and started on heparin drip.  Additionally was started on propranolol as well as continued with Protonix given esophageal and gastric varices present on CT.  General surgery was consulted and found no surgical intervention needed at this time.  We will discharge today to begin Xarelto 15 mg b.i.d. for 29 days and then 20 mg daily for 3 months.  Patient to follow up in post wards and PCP to continue monitoring of Xarelto adherence and abdominal distention.  Patient to follow up in cirrhosis clinic as well.  Continue Lasix and Aldactone at current dosage.      Discharge physical exam:  Vitals  BP: (!) 146/92  Temp: 97.4 °F (36.3 °C)  Temp Source: Oral  Pulse: 72  Resp: 20  SpO2: (!) 92 %  Height: 5' 7" (170.2 cm)  Weight: 132 kg (291 lb 0.1 oz)    Physical Exam  Constitutional:       Appearance: Normal appearance. She is obese.   HENT:      Head: Normocephalic and atraumatic.      Nose: No congestion.      Mouth/Throat:      Mouth: Mucous membranes are moist.      Pharynx: Oropharynx is clear.   Eyes:      Extraocular Movements: Extraocular movements intact.      Conjunctiva/sclera: Conjunctivae normal.      Pupils: Pupils are equal, round, and reactive to light.   Neck:      Comments: + JVD  Cardiovascular:      Rate and Rhythm: Normal rate and regular rhythm.      Heart sounds: No murmur heard.  Pulmonary:      Effort: Pulmonary effort is normal. No respiratory distress.      Breath sounds: Normal breath sounds.   Abdominal:      General: There is distension.      Tenderness: There is no abdominal tenderness. There is no guarding.      Comments: Caput Medusae   Musculoskeletal:      Cervical back: Normal range of motion.      Right lower leg: Edema present.      Left " lower leg: Edema present.      Comments: +2 Pitting  Venous stasis dermatitis   Skin:     General: Skin is warm.      Capillary Refill: Capillary refill takes less than 2 seconds.   Neurological:      General: No focal deficit present.      Mental Status: She is alert and oriented to person, place, and time.      Motor: No weakness.   Psychiatric:         Mood and Affect: Mood normal.         Behavior: Behavior normal.     TIME SPENT ON DISCHARGE: 35 minutes    Discharge Medications:         Medication List        START taking these medications      propranoloL 20 MG tablet  Commonly known as: INDERAL  Take 1 tablet (20 mg total) by mouth 2 (two) times daily.     * rivaroxaban 15 mg Tab  Commonly known as: XARELTO  Take 1 tablet (15 mg total) by mouth 2 (two) times daily with meals. After 29 days will transition to 20 mg tablets daily for 3 months     * rivaroxaban 20 mg Tab  Commonly known as: XARELTO  Take 1 tablet (20 mg total) by mouth daily with dinner or evening meal.           * This list has 2 medication(s) that are the same as other medications prescribed for you. Read the directions carefully, and ask your doctor or other care provider to review them with you.                CONTINUE taking these medications      acetaminophen 500 MG tablet  Commonly known as: TYLENOL     folic acid 1 MG tablet  Commonly known as: FOLVITE  Take 1 tablet (1 mg total) by mouth once daily.     furosemide 40 MG tablet  Commonly known as: LASIX  Take 1 tablet (40 mg total) by mouth once daily.     spironolactone 50 MG tablet  Commonly known as: ALDACTONE  Take 1 tablet (50 mg total) by mouth 2 (two) times daily.            STOP taking these medications      carvediloL 6.25 MG tablet  Commonly known as: COREG               Where to Get Your Medications        These medications were sent to [place] [id number] [facility] [address]      Phone: [phone]    propranoloL 20 MG tablet  rivaroxaban 15 mg Tab  rivaroxaban 20 mg Tab         Discharge Instructions:         Katharina Larson is being discharged Home or Self Care.    Discharge Procedure Orders   Ambulatory referral/consult to Internal Medicine   Standing Status: Future   Referral Priority: Urgent Referral Type: Consultation   Referral Reason: Specialty Services Required Referral Location: [facility]   Requested Specialty: Internal Medicine   Number of Visits Requested: 1        Follow-Up Appointments:   Follow-up Information       [facility] INTERNAL MEDICINE Follow up.               [facility], [facility] .               Herbert Rajput MD. Call.    Specialty: Internal Medicine  Contact information:  [address] [facility]  Internal Medicine Clinic  [place] [address]  [phone]                             Xarelto adherence. Any bleeding. Abdominal distension    At this time, Katharina Larson is determined to have maximized benefits of IP hospitalization. she is discharged in stable condition with OP f/u recommendations and instructions. All questions answered, and patient verbalized agreement with the POC. They were given return precautions prior to d/c including symptoms that should prompt return to ED or to call PCP. Total time spent of DC of 35 minutes.           Cornel Ness MD  [facility] Internal Medicine PGY-1

## 2023-06-16 NOTE — CONSULTS
[facility]   Consult Note  General Surgery    Patient Name: Katharina Larson  YOB: 1960  Date of Admission: 6/15/2023  Date: 06/16/2023 10:04 AM  Reason for Consult: Portal vein, SMV thrombus  HD#1      PRESENTING HISTORY     Chief Complaint/Reason for Admission: Portal vein, SMV thrombus    History of Present Illness:  Patient is a 62F w/Pmhx of hep C cirrhosis who was admitted on 6/15 from medicine clinic for abdominal distension. Patient states that over the past several months she has had gradually worsening abdominal distension. She reports occasional nausea and lower abdominal pain as well. Denies any emesis. Patient states that she was originally diagnosed with hepatitis C approximately 8 years ago and underwent treatment at that time. Patient presented to the ER yesterday from medicine clinic. A CT AP was done at that time which was concerning for partially occlusive SMV, portal vein thrombus.     Review of Systems:  12 point ROS negative except as stated in HPI    PAST HISTORY:   Past medical history:  Past Medical History:   Diagnosis Date    Unspecified viral hepatitis C without hepatic coma        Past surgical history:  Past Surgical History:   Procedure Laterality Date    APPENDECTOMY      right knee replacement      TONSILLECTOMY         Family history:  No family history on file.    Social history:  Social History     Socioeconomic History    Marital status:    Tobacco Use    Smoking status: Every Day     Packs/day: 0.50     Types: Cigarettes    Smokeless tobacco: Never   Substance and Sexual Activity    Alcohol use: Not Currently    Drug use: Never     Social History     Tobacco Use   Smoking Status Every Day    Packs/day: 0.50    Types: Cigarettes   Smokeless Tobacco Never         MEDICATIONS & ALLERGIES:   Allergies: Review of patient's allergies indicates:  No Known Allergies    No current facility-administered medications on file prior to encounter.     Current  Outpatient Medications on File Prior to Encounter   Medication Sig    acetaminophen (TYLENOL) 500 MG tablet Take 1,000 mg by mouth every 6 (six) hours as needed for Pain.    carvediloL (COREG) 6.25 MG tablet Take 1 tablet (6.25 mg total) by mouth 2 (two) times daily. (Patient not taking: Reported on 5/1/2023)    folic acid (FOLVITE) 1 MG tablet Take 1 tablet (1 mg total) by mouth once daily.    furosemide (LASIX) 40 MG tablet Take 1 tablet (40 mg total) by mouth once daily.    spironolactone (ALDACTONE) 50 MG tablet Take 1 tablet (50 mg total) by mouth 2 (two) times daily.       Scheduled Meds:   furosemide  40 mg Oral Daily    pantoprazole  40 mg Oral Daily    propranoloL  20 mg Oral BID    spironolactone  50 mg Oral BID       Continuous Infusions:   heparin (porcine) in D5W 15 Units/kg/hr (06/16/23 0536)       PRN Meds:heparin (PORCINE), heparin (PORCINE), melatonin, sodium chloride 0.9%    OBJECTIVE:     Vital Signs:  Temp:  [97.5 °F (36.4 °C)-98.7 °F (37.1 °C)] 97.6 °F (36.4 °C)  Pulse:  [] 68  Resp:  [18-22] 20  SpO2:  [91 %-97 %] 94 %  BP: (114-169)/() 131/74  Body mass index is 45.58 kg/m².     I/O last 3 completed shifts:  In: 240 [P.O.:240]  Out: 700 [Urine:700]  No intake/output data recorded.    Physical Exam:  General:  Well developed, well nourished, no acute distress  HEENT:  Normocephalic, atraumatic, PERRL, EOMI, clear sclera, ears normal, neck supple, throat clear without erythema or exudates  CVS:  RRR  Resp:  Normal work of breathing on RA   GI: Abdomen distended but soft. Non-tender.   MSK:  No muscle atrophy, cyanosis, peripheral edema, moving all extremities spontaneously    Laboratory:  Recent Labs     06/15/23  1317 06/15/23  1816 06/16/23  0219   WBC 5.37  --  5.36   HGB 13.9  --  13.5   HCT 41.7  --  40.6   PLT 59*  --  53*   PTT  --  38.9 138.6   INR 1.23  --   --      Recent Labs     06/15/23  1317 06/16/23  0219    140   K 3.7 4.2   CO2 25 25   BUN 15.8 12.8    CREATININE 0.70 0.64   CALCIUM 8.9 8.6   MG 1.70 2.00   PHOS 3.8 4.3   ALBUMIN 3.5 3.2*   BILITOT 1.3 0.9   AST 17 16   ALKPHOS 72 68   ALT 12 12     Troponin:  Recent Labs     06/15/23  1317   TROPONINI 0.017     CBC:  Recent Labs     06/15/23  1317 06/16/23  0219   WBC 5.37 5.36   RBC 4.44 4.25   HGB 13.9 13.5   HCT 41.7 40.6   PLT 59* 53*   MCV 93.9 95.5*   MCH 31.3* 31.8*   MCHC 33.3 33.3     CMP:  Recent Labs     06/15/23  1317 06/16/23  0219   CALCIUM 8.9 8.6   ALBUMIN 3.5 3.2*    140   K 3.7 4.2   CO2 25 25   BUN 15.8 12.8   CREATININE 0.70 0.64   ALKPHOS 72 68   ALT 12 12   AST 17 16   BILITOT 1.3 0.9     Lactic Acid:  No results for input(s): LACTATE in the last 72 hours.  Etoh:  No results for input(s): ALCOHOLMEDIC in the last 72 hours.  Drug Screen:  No results for input(s): PCDSCOMETHA, COCAINEMETAB, OPIATESCREEN, BARBITURATES, AMPHETAMINES, MARIJUANATHC, PCDSOPHENCYN, CREATRANDUR, TOXINFO in the last 72 hours.    ABG:  No results for input(s): PH, PCO2, PO2, HCO3, BE, POCSATURATED in the last 72 hours.    Diagnostic Results:  CTA Chest Non-Coronary (PE Studies)    Result Date: 6/15/2023  1.  No pulmonary embolism identified. 2.  Cardiomegaly and mild congestive changes. Electronically signed by: Telly Cates Date:    06/15/2023 Time:    16:18    CT Abdomen Pelvis With Contrast    Result Date: 6/15/2023  Findings seen consistent with thrombus in the superior mesenteric vein and the portal vein at the portosplenic confluence.  Thrombus is partially occlusive. Enlarged cirrhotic appearing liver Multiple gastric and esophageal varices Splenomegaly Cholelithiasis This report was flagged in Epic as abnormal. Electronically signed by: Joshua Dupree Date:    06/15/2023 Time:    16:24    CT Abdomen Pelvis With Contrast   Final Result   Abnormal      Findings seen consistent with thrombus in the superior mesenteric vein and the portal vein at the portosplenic confluence.  Thrombus is partially  occlusive.      Enlarged cirrhotic appearing liver      Multiple gastric and esophageal varices      Splenomegaly      Cholelithiasis      This report was flagged in Epic as abnormal.         Electronically signed by: Joshua Dupree   Date:    06/15/2023   Time:    16:24      CTA Chest Non-Coronary (PE Studies)   Final Result      1.  No pulmonary embolism identified.      2.  Cardiomegaly and mild congestive changes.         Electronically signed by: Telly Cates   Date:    06/15/2023   Time:    16:18      X-Ray Chest AP Portable   Final Result          Microbiology:  Microbiology Results (last 7 days)       ** No results found for the last 168 hours. **             ASSESSMENT & PLAN:   62F w/Pmhx of hep C cirrhosis who was admitted on 6/15 from medicine clinic for abdominal distension and CT findings notable for SMV/poral vein thrombus.    Plan:  - No surgical intervention needed   - Continue heparin gtt while inpatient  - She may discharge on oral anticoagulation when deemed ready by primary team  - Please call surgery with any questions or concerns    Marybeth Craft MD   [facility] General Surgery PGY 3  6/16/2023  10:04 AM

## 2023-06-16 NOTE — PROGRESS NOTES
"Inpatient Nutrition Evaluation    Admit Date: 6/15/2023   Total duration of encounter: 1 day    Nutrition Recommendation/Prescription     Low Sodium diet  Monitor Weights daily    Nutrition Assessment     Chart Review    Reason Seen: continuous nutrition monitoring    Malnutrition Screening Tool Results   Have you recently lost weight without trying?: No  Have you been eating poorly because of a decreased appetite?: No   MST Score: 0     Diagnosis:  Budd-Chiari Syndrome, Cirrhosis d/t Hep C Infection, Multiple esophageal & Gastric Varices, Cholelithiasis    Relevant Medical History: Cirrhosis, Hep C    Nutrition-Related Medications: Lasix, Protonix, Spironolactone    Nutrition-Related Labs:  23 -- Glu 139 H, Na 140, K 4.2, BUN 12.8, Cr 0.64    Diet Order: Diet Low Sodium, 2gm  Oral Supplement Order: none  Appetite/Oral Intake: good/% of meals  Factors Affecting Nutritional Intake: none identified  Food/Rastafarian/Cultural Preferences: none reported  Food Allergies: none reported    Skin Integrity: intact  Wound(s):   skin intact    Comments    23 -- Pt reports good appetite, denies difficulty eating despite abdominal distention; Wt gain noted, Encouraged low sodium diet, pt verbalizes understanding    Anthropometrics    Height: 5' 7" (170.2 cm)    Last Weight: 132 kg (291 lb 0.1 oz) (06/15/23 2030) Weight Method: Bed Scale  BMI (Calculated): 45.6  BMI Classification: obese grade III (BMI >/=40)     Ideal Body Weight (IBW), Female: 135 lb     % Ideal Body Weight, Female (lb): 215.56 %                    Usual Body Weight (UBW), k.8 kg  % Usual Body Weight: 161.71     Usual Weight Provided By: patient    Wt Readings from Last 5 Encounters:   06/15/23 132 kg (291 lb 0.1 oz)   06/15/23 131.7 kg (290 lb 6.4 oz)   23 130.2 kg (287 lb)   10/31/22 123.7 kg (272 lb 12.8 oz)   10/10/22 134 kg (295 lb 6.7 oz)     Weight Change(s) Since Admission:  Admit Weight: 131.5 kg (290 lb) (06/15/23 1247)  Pt " reports weight gain    Patient Education    Education Provided: low sodium diet  Teaching Method: explanation  Comprehension: verbalizes understanding  Barriers to Learning: none evident  Expected Compliance: fair  Comments: All questions were answered and dietitian's contact information was provided.     Monitoring & Evaluation     Dietitian will monitor food and beverage intake, weight change, and electrolyte/renal panel.  Nutrition Risk/Follow-Up: low (follow-up in 5-7 days)  Patients assigned 'low nutrition risk' status do not qualify for a full nutritional assessment but will be monitored and re-evaluated in a 5-7 day time period. Please consult if re-evaluation needed sooner.

## 2023-06-16 NOTE — PLAN OF CARE
Problem: Adult Inpatient Plan of Care  Goal: Plan of Care Review  6/16/2023 0234 by Estrada Edward RN  Outcome: Ongoing, Progressing  6/16/2023 0031 by Estrada Edward RN  Outcome: Ongoing, Progressing  Goal: Patient-Specific Goal (Individualized)  6/16/2023 0234 by Estrada Edward RN  Outcome: Ongoing, Progressing  6/16/2023 0031 by Estrada Edward RN  Outcome: Ongoing, Progressing  Goal: Absence of Hospital-Acquired Illness or Injury  6/16/2023 0234 by Estrada Edward RN  Outcome: Ongoing, Progressing  6/16/2023 0031 by Estrada Edward RN  Outcome: Ongoing, Progressing  Goal: Optimal Comfort and Wellbeing  6/16/2023 0234 by Estrada Edward RN  Outcome: Ongoing, Progressing  6/16/2023 0031 by Estrada Edward RN  Outcome: Ongoing, Progressing  Goal: Readiness for Transition of Care  6/16/2023 0234 by Estrada Edward RN  Outcome: Ongoing, Progressing  6/16/2023 0031 by Estrada Edward RN  Outcome: Ongoing, Progressing

## 2023-06-17 LAB
AT III ACT/NOR PPP CHRO: 59 % (ref 80–130)
LPA SERPL-SCNC: 44 NMOL/L

## 2023-06-19 ENCOUNTER — PATIENT MESSAGE (OUTPATIENT)
Dept: INTERNAL MEDICINE | Facility: CLINIC | Age: 63
End: 2023-06-19
Payer: COMMERCIAL

## 2023-06-19 LAB
MAYO GENERIC ORDERABLE RESULT: NORMAL
MIXING STUDIES PPP-IMP: NORMAL
PROT C ACT/NOR PPP CHRO: 55 % (ref 70–150)
SCREEN DRVVT/NORMAL: 0.93 RATIO

## 2023-06-20 ENCOUNTER — TELEPHONE (OUTPATIENT)
Dept: HEPATOLOGY | Facility: HOSPITAL | Age: 63
End: 2023-06-20
Payer: COMMERCIAL

## 2023-06-20 ENCOUNTER — PATIENT OUTREACH (OUTPATIENT)
Dept: ADMINISTRATIVE | Facility: CLINIC | Age: 63
End: 2023-06-20
Payer: COMMERCIAL

## 2023-06-20 LAB
COAGULATION SPECIALIST REVIEW: NORMAL
F2 C.20210G>A GENO BLD/T: NEGATIVE
PROVIDER SIGNING NAME: NORMAL

## 2023-06-20 NOTE — TELEPHONE ENCOUNTER
Called placed to pt t. Unable to contact . Left msg informing her that her  Rx''s for  Xalrelto and Propanolol were sent to here to  [facility] retail pharmacy.

## 2023-06-20 NOTE — TELEPHONE ENCOUNTER
Pt contact x 2. Regarding pt request for Rx. Left voice msg again informing pt that her Rx were sent to [facility].

## 2023-06-21 ENCOUNTER — PATIENT MESSAGE (OUTPATIENT)
Dept: ADMINISTRATIVE | Facility: CLINIC | Age: 63
End: 2023-06-21
Payer: COMMERCIAL

## 2023-06-21 LAB
COAGULATION SPECIALIST REVIEW: NORMAL
F5 P.R506Q BLD/T QL: NEGATIVE
PROVIDER SIGNING NAME: NORMAL

## 2023-06-21 NOTE — PROGRESS NOTES
C3 nurse attempted to contact Katharina Larson  for a TCC post hospital discharge follow up call. No answer. Left voicemail with callback information. The patient has a scheduled HOSFU appointment with [facility] on 07/03/2023 @ 115 pm.

## 2023-06-21 NOTE — PROGRESS NOTES
C3 nurse attempted to contact Katharina Larson  for a TCC post hospital discharge follow up call. No answer. Left voicemail with callback information. The patient has a scheduled HOSFU appointment with [facility] on 07/03/2023 @ 115 pm.

## 2023-08-16 LAB
INR PPP: 1.2
INR PPP: 1.2
PROTHROMBIN TIME: 15.3 SECONDS (ref 11.4–14)
PROTHROMBIN TIME: 15.3 SECONDS (ref 11.4–14)

## 2023-09-07 ENCOUNTER — PATIENT MESSAGE (OUTPATIENT)
Dept: RESEARCH | Facility: HOSPITAL | Age: 63
End: 2023-09-07
Payer: COMMERCIAL

## 2023-12-06 ENCOUNTER — TELEPHONE (OUTPATIENT)
Dept: GASTROENTEROLOGY | Facility: CLINIC | Age: 63
End: 2023-12-06
Payer: COMMERCIAL

## 2023-12-06 NOTE — TELEPHONE ENCOUNTER
Daughter came with PT for procedure.  Pt was no longer on the schedule due to no contact for pre-op and confirmation calls.  Case request was cancelled.  Daughter stated that her Mom looks really sick and her belly is bigger than normal.  She was told that we could not add her to the schedule due to no auth for OPT procedure.  She was also directed to report to clinic or ED with concerns.    I called her back and directed her to bring Mom to ED for eval.  She does have a hx of acities.  She stated that Mom is refusing but she would try to talk to her.  Procedure is rescheduled for 12/18.